# Patient Record
Sex: FEMALE | Race: BLACK OR AFRICAN AMERICAN | NOT HISPANIC OR LATINO | Employment: UNEMPLOYED | URBAN - METROPOLITAN AREA
[De-identification: names, ages, dates, MRNs, and addresses within clinical notes are randomized per-mention and may not be internally consistent; named-entity substitution may affect disease eponyms.]

---

## 2021-06-05 ENCOUNTER — RECORDS - HEALTHEAST (OUTPATIENT)
Dept: ADMINISTRATIVE | Facility: CLINIC | Age: 71
End: 2021-06-05

## 2023-06-10 ENCOUNTER — HOSPITAL ENCOUNTER (EMERGENCY)
Facility: CLINIC | Age: 73
Discharge: HOME | End: 2023-06-10
Attending: EMERGENCY MEDICINE | Admitting: EMERGENCY MEDICINE
Payer: COMMERCIAL

## 2023-06-10 ENCOUNTER — APPOINTMENT (OUTPATIENT)
Dept: CT IMAGING | Facility: CLINIC | Age: 73
End: 2023-06-10
Attending: EMERGENCY MEDICINE
Payer: COMMERCIAL

## 2023-06-10 VITALS
RESPIRATION RATE: 18 BRPM | TEMPERATURE: 97.5 F | HEART RATE: 84 BPM | SYSTOLIC BLOOD PRESSURE: 142 MMHG | DIASTOLIC BLOOD PRESSURE: 67 MMHG | OXYGEN SATURATION: 98 %

## 2023-06-10 DIAGNOSIS — R51.9 NONINTRACTABLE HEADACHE, UNSPECIFIED CHRONICITY PATTERN, UNSPECIFIED HEADACHE TYPE: ICD-10-CM

## 2023-06-10 PROCEDURE — 96375 TX/PRO/DX INJ NEW DRUG ADDON: CPT

## 2023-06-10 PROCEDURE — 99285 EMERGENCY DEPT VISIT HI MDM: CPT | Mod: 25

## 2023-06-10 PROCEDURE — 258N000003 HC RX IP 258 OP 636: Performed by: EMERGENCY MEDICINE

## 2023-06-10 PROCEDURE — 96374 THER/PROPH/DIAG INJ IV PUSH: CPT

## 2023-06-10 PROCEDURE — 70450 CT HEAD/BRAIN W/O DYE: CPT

## 2023-06-10 PROCEDURE — 250N000011 HC RX IP 250 OP 636: Performed by: EMERGENCY MEDICINE

## 2023-06-10 PROCEDURE — 96361 HYDRATE IV INFUSION ADD-ON: CPT

## 2023-06-10 RX ORDER — DIPHENHYDRAMINE HYDROCHLORIDE 50 MG/ML
25 INJECTION INTRAMUSCULAR; INTRAVENOUS ONCE
Status: COMPLETED | OUTPATIENT
Start: 2023-06-10 | End: 2023-06-10

## 2023-06-10 RX ORDER — NIFEDIPINE 60 MG/1
60 TABLET, EXTENDED RELEASE ORAL
COMMUNITY
Start: 2023-04-19

## 2023-06-10 RX ORDER — PROCHLORPERAZINE MALEATE 10 MG
5 TABLET ORAL EVERY 6 HOURS PRN
Qty: 12 TABLET | Refills: 0 | Status: SHIPPED | OUTPATIENT
Start: 2023-06-10

## 2023-06-10 RX ORDER — METOPROLOL SUCCINATE 100 MG/1
100 TABLET, EXTENDED RELEASE ORAL
Status: ON HOLD | COMMUNITY
Start: 2023-04-19 | End: 2024-02-27

## 2023-06-10 RX ORDER — TOBRAMYCIN AND DEXAMETHASONE 3; 1 MG/ML; MG/ML
SUSPENSION/ DROPS OPHTHALMIC
Status: ON HOLD | COMMUNITY
Start: 2022-03-02 | End: 2024-02-27

## 2023-06-10 RX ORDER — ATORVASTATIN CALCIUM 20 MG/1
20 TABLET, FILM COATED ORAL
COMMUNITY
Start: 2023-04-19

## 2023-06-10 RX ORDER — SODIUM CHLORIDE 9 MG/ML
INJECTION, SOLUTION INTRAVENOUS CONTINUOUS
Status: DISCONTINUED | OUTPATIENT
Start: 2023-06-10 | End: 2023-06-10 | Stop reason: HOSPADM

## 2023-06-10 RX ORDER — CYCLOSPORINE 0.5 MG/ML
1 EMULSION OPHTHALMIC 2 TIMES DAILY
COMMUNITY
Start: 2022-03-02

## 2023-06-10 RX ADMIN — SODIUM CHLORIDE 1000 ML: 9 INJECTION, SOLUTION INTRAVENOUS at 15:58

## 2023-06-10 RX ADMIN — PROCHLORPERAZINE EDISYLATE 5 MG: 5 INJECTION INTRAMUSCULAR; INTRAVENOUS at 15:59

## 2023-06-10 RX ADMIN — DIPHENHYDRAMINE HYDROCHLORIDE 25 MG: 50 INJECTION, SOLUTION INTRAMUSCULAR; INTRAVENOUS at 15:58

## 2023-06-10 ASSESSMENT — ACTIVITIES OF DAILY LIVING (ADL): ADLS_ACUITY_SCORE: 35

## 2023-06-10 NOTE — ED PROVIDER NOTES
History     Chief Complaint:  Headache       The history is provided by the patient and a relative.      Yane Courtney is a 72 year old female, anticoagulated on Eliquis, with history of chronic headache, traumatic brain injury, and CVA who presents to the ED via car with her daughter for evaluation of headache. Patient's daughter reports patient has a headache since a couple of days ago. She woke up this morning with nausea. She last took tylenol at 1300 today with little relief of symptoms. Patient denies vomiting, fever, chest pain, or shortness of breath. Patient's daughter denies recent falls of head injuries.    Independent Historian:   Daughter reported as above    Review of External Notes:   None     Medications:    Eliquis  Lipitor  Toprol  Procardia  Tambocor  Compazine  Mylicon  Linzess  Prevacid  Neurontin  Norco  Lucrecia-lanta    Past Medical History:    Osteoarthritis of right knee  PAD  Mild aortic stenosis  Mild mitral stenosis  Hypercholesterolemia  Hypertension  IBS  Osteopenia of multiple sites  IFG  Idiopathic peripheral neuropathy  Hx of traumatic brain injury  Hx of CVA  GERD  Chronic headache  Chronic bilateral low back pain with bilateral sciatica    Past Surgical History:    LAP Cholecystectomy  Cataract removal with implant, bilateral  Lumbar epidural steroid injection    Physical Exam     Patient Vitals for the past 24 hrs:   BP Temp Temp src Pulse Resp SpO2   06/10/23 1802 (!) 142/67 -- -- 84 -- --   06/10/23 1529 (!) 153/63 97.5  F (36.4  C) Oral 83 18 98 %        Physical Exam  General:  Sitting on bed.  Daughter at bedside.  HENT:  No obvious trauma to head  Right Ear:  External ear normal.   Left Ear:  External ear normal.   Nose:  Nose normal.   Eyes:  Conjunctivae and EOM are normal. Pupils are equal, round, and reactive.   Neck: Normal range of motion. Neck supple. No tracheal deviation present.   CV:  Normal heart sounds. No murmur heard.  Pulm/Chest: Effort normal and breath  sounds normal.   Abd: Soft. No distension. There is no tenderness. There is no rigidity, no rebound and no guarding.   M/S: Normal range of motion.   Neuro: Awake and alert. CN II-XII Grossly intact, no pronator drift, normal finger-nose-finger, visual fields intact by confrontation. Muscle strength is +5 proximal and distal in the bilateral upper and lower extremities. No dysarthria. Normal palm up, palm down. No meningeal signs  Skin: Skin is warm and dry. No rash noted. Not diaphoretic.   Psych: Normal mood and affect. Behavior is normal.     Emergency Department Course   Imaging:  Head CT w/o contrast   Final Result   IMPRESSION:   1.  No acute intracranial process.            Report per radiology    Emergency Department Course & Assessments:  Interventions:  Medications   0.9% sodium chloride BOLUS (0 mLs Intravenous Stopped 6/10/23 1800)     Followed by   sodium chloride 0.9% infusion (has no administration in time range)   prochlorperazine (COMPAZINE) injection 5 mg (5 mg Intravenous $Given 6/10/23 1559)   diphenhydrAMINE (BENADRYL) injection 25 mg (25 mg Intravenous $Given 6/10/23 1558)      Assessments:  1539 I obtained history and examined the patient as noted above.  1756 I rechecked the patient and explained findings. Patient reports they are feeling much better.    Independent Interpretation (X-rays, CTs, rhythm strip):  None    Consultations/Discussion of Management or Tests:  None     Social Determinants of Health affecting care:   None    Disposition:  The patient was discharged to home.     Impression & Plan    Medical Decision Making:  Yane Courtney is a very pleasant 72 year old year old patient who presents to the emergency department with concern of headache.  This began gradually a few days ago.  It has worsened today.  No trauma.  No fever at home and she is afebrile here.  Patient's neurologic exam is normal.  She is on Eliquis.  I did consider intracranial hemorrhage.  She consented and  desired a CT.  Fortunately, this is unremarkable.  She received the above interventions and felt much better.  She did report very slight eye discomfort which she has had with headaches before.  No loss or change in vision and pupillary responses normal and I doubt acute closed angle glaucoma of both eyes.  No injection or drainage to suggest conjunctivitis.  She has no meningeal signs to suggest meningitis.  States she felt significantly better with above interventions, Compazine prescribed in case she has any recurrent nausea or return of headache.  Recommended close follow-up with PCP.    The treatment plan was discussed with the patient and they expressed understanding of this plan and consented to the plan.  In addition, the patient will return to the emergency department if their symptoms persist, worsen, if new symptoms arise or if there is any concern as other pathology may be present that is not evident at this time. They also understand the importance of close follow up in the clinic and if unable to do so will return to the emergency department for a reevaluation. All questions were answered.    Diagnosis:    ICD-10-CM    1. Nonintractable headache, unspecified chronicity pattern, unspecified headache type  R51.9            Discharge Medications:  Discharge Medication List as of 6/10/2023  6:01 PM      START taking these medications    Details   prochlorperazine (COMPAZINE) 10 MG tablet Take 0.5 tablets (5 mg) by mouth every 6 hours as needed for nausea or vomiting, Disp-12 tablet, R-0, E-Prescribe                Scribe Disclosure:  Jono GILLESPIE Hailie, am serving as a scribe at 4:37 PM on 6/10/2023 to document services personally performed by Luis Forrest DO based on my observations and the provider's statements to me.   6/10/2023   Luis Forrest DO Anderson, Robert James, DO  06/10/23 8982

## 2023-06-10 NOTE — ED TRIAGE NOTES
Headache for two days, difficulty opening her eyes because of it. Denies any blurred vision or vision changes.      Triage Assessment       Row Name 06/10/23 3589       Triage Assessment (Adult)    Airway WDL WDL       Respiratory WDL    Respiratory WDL WDL       Skin Circulation/Temperature WDL    Skin Circulation/Temperature WDL WDL       Cardiac WDL    Cardiac WDL WDL       Peripheral/Neurovascular WDL    Peripheral Neurovascular WDL WDL       Cognitive/Neuro/Behavioral WDL    Cognitive/Neuro/Behavioral WDL X  headache

## 2023-12-26 ENCOUNTER — HOSPITAL ENCOUNTER (OUTPATIENT)
Dept: NUCLEAR MEDICINE | Facility: CLINIC | Age: 73
Setting detail: NUCLEAR MEDICINE
Discharge: HOME | End: 2023-12-26
Attending: INTERNAL MEDICINE | Admitting: INTERNAL MEDICINE
Payer: COMMERCIAL

## 2023-12-26 PROCEDURE — 78264 GASTRIC EMPTYING IMG STUDY: CPT

## 2023-12-26 PROCEDURE — A9541 TC99M SULFUR COLLOID: HCPCS | Performed by: INTERNAL MEDICINE

## 2023-12-26 PROCEDURE — 78264 GASTRIC EMPTYING IMG STUDY: CPT | Mod: 26 | Performed by: RADIOLOGY

## 2023-12-26 PROCEDURE — 343N000001 HC RX 343: Performed by: INTERNAL MEDICINE

## 2023-12-26 RX ADMIN — Medication 2 MILLICURIE: at 08:00

## 2024-02-05 ENCOUNTER — MEDICAL CORRESPONDENCE (OUTPATIENT)
Dept: HEALTH INFORMATION MANAGEMENT | Facility: CLINIC | Age: 74
End: 2024-02-05
Payer: COMMERCIAL

## 2024-02-27 ENCOUNTER — ANESTHESIA EVENT (OUTPATIENT)
Dept: SURGERY | Facility: CLINIC | Age: 74
End: 2024-02-27
Payer: COMMERCIAL

## 2024-02-27 ENCOUNTER — APPOINTMENT (OUTPATIENT)
Dept: GENERAL RADIOLOGY | Facility: CLINIC | Age: 74
End: 2024-02-27
Attending: ORTHOPAEDIC SURGERY
Payer: COMMERCIAL

## 2024-02-27 ENCOUNTER — ANESTHESIA (OUTPATIENT)
Dept: SURGERY | Facility: CLINIC | Age: 74
End: 2024-02-27
Payer: COMMERCIAL

## 2024-02-27 ENCOUNTER — HOSPITAL ENCOUNTER (OUTPATIENT)
Facility: CLINIC | Age: 74
Discharge: HOME | End: 2024-02-28
Attending: ORTHOPAEDIC SURGERY | Admitting: ORTHOPAEDIC SURGERY
Payer: COMMERCIAL

## 2024-02-27 ENCOUNTER — APPOINTMENT (OUTPATIENT)
Dept: PHYSICAL THERAPY | Facility: CLINIC | Age: 74
End: 2024-02-27
Attending: ORTHOPAEDIC SURGERY
Payer: COMMERCIAL

## 2024-02-27 DIAGNOSIS — Z96.651 S/P TOTAL KNEE ARTHROPLASTY, RIGHT: Primary | ICD-10-CM

## 2024-02-27 PROBLEM — G43.909 MIGRAINE HEADACHE: Status: ACTIVE | Noted: 2024-02-27

## 2024-02-27 PROBLEM — T37.8X5A: Status: ACTIVE | Noted: 2023-07-31

## 2024-02-27 PROBLEM — I05.0 MILD MITRAL STENOSIS: Status: ACTIVE | Noted: 2021-11-09

## 2024-02-27 PROBLEM — M85.89 OSTEOPENIA OF MULTIPLE SITES: Status: ACTIVE | Noted: 2020-02-05

## 2024-02-27 PROBLEM — M54.30 SCIATICA: Status: ACTIVE | Noted: 2024-02-27

## 2024-02-27 PROBLEM — E78.00 PURE HYPERCHOLESTEROLEMIA: Status: ACTIVE | Noted: 2020-11-02

## 2024-02-27 PROBLEM — I35.0 MILD AORTIC STENOSIS: Status: ACTIVE | Noted: 2021-11-09

## 2024-02-27 PROBLEM — M25.60 JOINT STIFFNESS: Status: ACTIVE | Noted: 2024-02-27

## 2024-02-27 PROBLEM — K58.1 IRRITABLE BOWEL SYNDROME WITH CONSTIPATION: Status: ACTIVE | Noted: 2020-06-05

## 2024-02-27 PROBLEM — I48.0 PAF (PAROXYSMAL ATRIAL FIBRILLATION) (H): Status: ACTIVE | Noted: 2021-11-17

## 2024-02-27 PROBLEM — Z86.73 HISTORY OF CVA (CEREBROVASCULAR ACCIDENT): Status: ACTIVE | Noted: 2019-09-25

## 2024-02-27 PROBLEM — M17.11 PRIMARY OSTEOARTHRITIS OF RIGHT KNEE: Status: ACTIVE | Noted: 2023-05-31

## 2024-02-27 LAB
CREAT SERPL-MCNC: 0.64 MG/DL (ref 0.51–0.95)
EGFRCR SERPLBLD CKD-EPI 2021: >90 ML/MIN/1.73M2
FASTING STATUS PATIENT QL REPORTED: YES
GLUCOSE SERPL-MCNC: 120 MG/DL (ref 70–99)
POTASSIUM SERPL-SCNC: 3.4 MMOL/L (ref 3.4–5.3)

## 2024-02-27 PROCEDURE — 250N000025 HC SEVOFLURANE, PER MIN: Performed by: ORTHOPAEDIC SURGERY

## 2024-02-27 PROCEDURE — 250N000011 HC RX IP 250 OP 636: Performed by: ORTHOPAEDIC SURGERY

## 2024-02-27 PROCEDURE — 258N000001 HC RX 258: Performed by: ORTHOPAEDIC SURGERY

## 2024-02-27 PROCEDURE — 250N000011 HC RX IP 250 OP 636

## 2024-02-27 PROCEDURE — 82947 ASSAY GLUCOSE BLOOD QUANT: CPT | Performed by: ANESTHESIOLOGY

## 2024-02-27 PROCEDURE — 258N000003 HC RX IP 258 OP 636: Performed by: ORTHOPAEDIC SURGERY

## 2024-02-27 PROCEDURE — 27447 TOTAL KNEE ARTHROPLASTY: CPT | Performed by: ANESTHESIOLOGY

## 2024-02-27 PROCEDURE — 82565 ASSAY OF CREATININE: CPT | Performed by: ORTHOPAEDIC SURGERY

## 2024-02-27 PROCEDURE — 250N000009 HC RX 250: Performed by: NURSE ANESTHETIST, CERTIFIED REGISTERED

## 2024-02-27 PROCEDURE — C1713 ANCHOR/SCREW BN/BN,TIS/BN: HCPCS | Performed by: ORTHOPAEDIC SURGERY

## 2024-02-27 PROCEDURE — 250N000011 HC RX IP 250 OP 636: Performed by: PHYSICIAN ASSISTANT

## 2024-02-27 PROCEDURE — 250N000011 HC RX IP 250 OP 636: Performed by: NURSE ANESTHETIST, CERTIFIED REGISTERED

## 2024-02-27 PROCEDURE — 27447 TOTAL KNEE ARTHROPLASTY: CPT | Performed by: NURSE ANESTHETIST, CERTIFIED REGISTERED

## 2024-02-27 PROCEDURE — C9113 INJ PANTOPRAZOLE SODIUM, VIA: HCPCS

## 2024-02-27 PROCEDURE — 999N000141 HC STATISTIC PRE-PROCEDURE NURSING ASSESSMENT: Performed by: ORTHOPAEDIC SURGERY

## 2024-02-27 PROCEDURE — 258N000003 HC RX IP 258 OP 636: Performed by: ANESTHESIOLOGY

## 2024-02-27 PROCEDURE — 99100 ANES PT EXTEME AGE<1 YR&>70: CPT | Performed by: NURSE ANESTHETIST, CERTIFIED REGISTERED

## 2024-02-27 PROCEDURE — 250N000013 HC RX MED GY IP 250 OP 250 PS 637: Performed by: PHYSICIAN ASSISTANT

## 2024-02-27 PROCEDURE — 64447 NJX AA&/STRD FEMORAL NRV IMG: CPT | Mod: 59 | Performed by: ANESTHESIOLOGY

## 2024-02-27 PROCEDURE — C1776 JOINT DEVICE (IMPLANTABLE): HCPCS | Performed by: ORTHOPAEDIC SURGERY

## 2024-02-27 PROCEDURE — 999N000065 XR KNEE PORT RIGHT 1/2 VIEWS: Mod: RT

## 2024-02-27 PROCEDURE — 250N000011 HC RX IP 250 OP 636: Performed by: ANESTHESIOLOGY

## 2024-02-27 PROCEDURE — 36415 COLL VENOUS BLD VENIPUNCTURE: CPT | Performed by: ANESTHESIOLOGY

## 2024-02-27 PROCEDURE — 99204 OFFICE O/P NEW MOD 45 MIN: CPT

## 2024-02-27 PROCEDURE — 250N000013 HC RX MED GY IP 250 OP 250 PS 637: Performed by: ANESTHESIOLOGY

## 2024-02-27 PROCEDURE — 272N000001 HC OR GENERAL SUPPLY STERILE: Performed by: ORTHOPAEDIC SURGERY

## 2024-02-27 PROCEDURE — 250N000013 HC RX MED GY IP 250 OP 250 PS 637: Performed by: ORTHOPAEDIC SURGERY

## 2024-02-27 PROCEDURE — 710N000009 HC RECOVERY PHASE 1, LEVEL 1, PER MIN: Performed by: ORTHOPAEDIC SURGERY

## 2024-02-27 PROCEDURE — 370N000017 HC ANESTHESIA TECHNICAL FEE, PER MIN: Performed by: ORTHOPAEDIC SURGERY

## 2024-02-27 PROCEDURE — 84132 ASSAY OF SERUM POTASSIUM: CPT | Performed by: ANESTHESIOLOGY

## 2024-02-27 PROCEDURE — 250N000009 HC RX 250: Performed by: ORTHOPAEDIC SURGERY

## 2024-02-27 PROCEDURE — 250N000013 HC RX MED GY IP 250 OP 250 PS 637

## 2024-02-27 PROCEDURE — 258N000003 HC RX IP 258 OP 636: Performed by: PHYSICIAN ASSISTANT

## 2024-02-27 PROCEDURE — 250N000009 HC RX 250: Performed by: ANESTHESIOLOGY

## 2024-02-27 PROCEDURE — 97161 PT EVAL LOW COMPLEX 20 MIN: CPT | Mod: GP

## 2024-02-27 PROCEDURE — 97530 THERAPEUTIC ACTIVITIES: CPT | Mod: GP

## 2024-02-27 PROCEDURE — 360N000077 HC SURGERY LEVEL 4, PER MIN: Performed by: ORTHOPAEDIC SURGERY

## 2024-02-27 PROCEDURE — 258N000003 HC RX IP 258 OP 636: Performed by: NURSE ANESTHETIST, CERTIFIED REGISTERED

## 2024-02-27 DEVICE — LEGION PS NP NARROW FEMORAL SZ 4 RT
Type: IMPLANTABLE DEVICE | Site: KNEE | Status: FUNCTIONAL
Brand: LEGION

## 2024-02-27 DEVICE — SIMPLEX® HV IS A FAST-SETTING ACRYLIC RESIN FOR USE IN BONE SURGERY. MIXING THE TWO SEPARATE STERILE COMPONENTS PRODUCES A DUCTILE BONE CEMENT WHICH, AFTER HARDENING, FIXES THE IMPLANT AND TRANSFERS STRESSES PRODUCED DURING MOVEMENT EVENLY TO THE BONE. SIMPLEX® HV CEMENT POWDER ALSO CONTAINS INSOLUBLE ZIRCONIUM DIOXIDE AS AN X-RAY CONTRAST MEDIUM. SIMPLEX® HV DOES NOT EMIT A SIGNAL AND DOES NOT POSE A SAFETY RISK IN A MAGNETIC RESONANCE ENVIRONMENT.
Type: IMPLANTABLE DEVICE | Site: KNEE | Status: FUNCTIONAL
Brand: SIMPLEX HV

## 2024-02-27 DEVICE — GENESIS II RESURFACING PATELLAR                                    PROSTHESIS  29MM
Type: IMPLANTABLE DEVICE | Site: KNEE | Status: FUNCTIONAL
Brand: GENESIS II

## 2024-02-27 DEVICE — LEGION PS HIGH FLEX XLPE SZ 1-2 11MM
Type: IMPLANTABLE DEVICE | Site: KNEE | Status: FUNCTIONAL
Brand: LEGION

## 2024-02-27 DEVICE — GENESIS II NON-POROUS TIBIAL                                    BASEPLATE SIZE 2 RIGHT
Type: IMPLANTABLE DEVICE | Site: KNEE | Status: FUNCTIONAL
Brand: GENESIS II

## 2024-02-27 RX ORDER — ONDANSETRON 2 MG/ML
INJECTION INTRAMUSCULAR; INTRAVENOUS PRN
Status: DISCONTINUED | OUTPATIENT
Start: 2024-02-27 | End: 2024-02-27

## 2024-02-27 RX ORDER — TRANEXAMIC ACID 650 MG/1
1950 TABLET ORAL ONCE
Status: COMPLETED | OUTPATIENT
Start: 2024-02-27 | End: 2024-02-27

## 2024-02-27 RX ORDER — HYDROXYZINE HYDROCHLORIDE 10 MG/1
10 TABLET, FILM COATED ORAL EVERY 6 HOURS PRN
Qty: 30 TABLET | Refills: 0 | Status: SHIPPED | OUTPATIENT
Start: 2024-02-27

## 2024-02-27 RX ORDER — LIDOCAINE 40 MG/G
CREAM TOPICAL
Status: DISCONTINUED | OUTPATIENT
Start: 2024-02-27 | End: 2024-02-27 | Stop reason: HOSPADM

## 2024-02-27 RX ORDER — GABAPENTIN 300 MG/1
300 CAPSULE ORAL 3 TIMES DAILY
COMMUNITY

## 2024-02-27 RX ORDER — CEFAZOLIN SODIUM 1 G/3ML
1 INJECTION, POWDER, FOR SOLUTION INTRAMUSCULAR; INTRAVENOUS EVERY 8 HOURS
Qty: 10 ML | Refills: 0 | Status: COMPLETED | OUTPATIENT
Start: 2024-02-27 | End: 2024-02-28

## 2024-02-27 RX ORDER — AMOXICILLIN 250 MG
1-2 CAPSULE ORAL 2 TIMES DAILY
Qty: 30 TABLET | Refills: 0 | Status: SHIPPED | OUTPATIENT
Start: 2024-02-27

## 2024-02-27 RX ORDER — CALCIUM CARBONATE 500 MG/1
1 TABLET, CHEWABLE ORAL 2 TIMES DAILY PRN
COMMUNITY

## 2024-02-27 RX ORDER — HYDRALAZINE HYDROCHLORIDE 20 MG/ML
10 INJECTION INTRAMUSCULAR; INTRAVENOUS EVERY 4 HOURS PRN
Status: DISCONTINUED | OUTPATIENT
Start: 2024-02-27 | End: 2024-02-28 | Stop reason: HOSPADM

## 2024-02-27 RX ORDER — CEFAZOLIN SODIUM/WATER 2 G/20 ML
2 SYRINGE (ML) INTRAVENOUS
Status: COMPLETED | OUTPATIENT
Start: 2024-02-27 | End: 2024-02-27

## 2024-02-27 RX ORDER — SODIUM CHLORIDE, SODIUM LACTATE, POTASSIUM CHLORIDE, CALCIUM CHLORIDE 600; 310; 30; 20 MG/100ML; MG/100ML; MG/100ML; MG/100ML
INJECTION, SOLUTION INTRAVENOUS CONTINUOUS
Status: DISCONTINUED | OUTPATIENT
Start: 2024-02-27 | End: 2024-02-27 | Stop reason: HOSPADM

## 2024-02-27 RX ORDER — NALOXONE HYDROCHLORIDE 0.4 MG/ML
0.1 INJECTION, SOLUTION INTRAMUSCULAR; INTRAVENOUS; SUBCUTANEOUS
Status: DISCONTINUED | OUTPATIENT
Start: 2024-02-27 | End: 2024-02-27 | Stop reason: HOSPADM

## 2024-02-27 RX ORDER — METOPROLOL SUCCINATE 100 MG/1
100 TABLET, EXTENDED RELEASE ORAL DAILY
Status: DISCONTINUED | OUTPATIENT
Start: 2024-02-27 | End: 2024-02-27

## 2024-02-27 RX ORDER — MAGNESIUM HYDROXIDE 1200 MG/15ML
LIQUID ORAL PRN
Status: DISCONTINUED | OUTPATIENT
Start: 2024-02-27 | End: 2024-02-27 | Stop reason: HOSPADM

## 2024-02-27 RX ORDER — BISACODYL 10 MG
10 SUPPOSITORY, RECTAL RECTAL DAILY PRN
Status: DISCONTINUED | OUTPATIENT
Start: 2024-03-01 | End: 2024-02-28 | Stop reason: HOSPADM

## 2024-02-27 RX ORDER — HYDROMORPHONE HCL IN WATER/PF 6 MG/30 ML
0.2 PATIENT CONTROLLED ANALGESIA SYRINGE INTRAVENOUS EVERY 5 MIN PRN
Status: DISCONTINUED | OUTPATIENT
Start: 2024-02-27 | End: 2024-02-27 | Stop reason: HOSPADM

## 2024-02-27 RX ORDER — ONDANSETRON 2 MG/ML
4 INJECTION INTRAMUSCULAR; INTRAVENOUS EVERY 30 MIN PRN
Status: DISCONTINUED | OUTPATIENT
Start: 2024-02-27 | End: 2024-02-27 | Stop reason: HOSPADM

## 2024-02-27 RX ORDER — HYDROMORPHONE HCL IN WATER/PF 6 MG/30 ML
0.4 PATIENT CONTROLLED ANALGESIA SYRINGE INTRAVENOUS
Status: DISCONTINUED | OUTPATIENT
Start: 2024-02-27 | End: 2024-02-28 | Stop reason: HOSPADM

## 2024-02-27 RX ORDER — MULTIVITAMIN,THERAPEUTIC
1 TABLET ORAL DAILY
COMMUNITY

## 2024-02-27 RX ORDER — ACETAMINOPHEN 325 MG/1
650 TABLET ORAL EVERY 4 HOURS PRN
Qty: 100 TABLET | Refills: 0 | Status: SHIPPED | OUTPATIENT
Start: 2024-02-27

## 2024-02-27 RX ORDER — ONDANSETRON 2 MG/ML
4 INJECTION INTRAMUSCULAR; INTRAVENOUS EVERY 6 HOURS PRN
Status: DISCONTINUED | OUTPATIENT
Start: 2024-02-27 | End: 2024-02-28 | Stop reason: HOSPADM

## 2024-02-27 RX ORDER — POLYETHYLENE GLYCOL 3350 17 G/17G
17 POWDER, FOR SOLUTION ORAL DAILY
Status: DISCONTINUED | OUTPATIENT
Start: 2024-02-28 | End: 2024-02-28 | Stop reason: HOSPADM

## 2024-02-27 RX ORDER — FENTANYL CITRATE 0.05 MG/ML
25 INJECTION, SOLUTION INTRAMUSCULAR; INTRAVENOUS EVERY 5 MIN PRN
Status: DISCONTINUED | OUTPATIENT
Start: 2024-02-27 | End: 2024-02-27 | Stop reason: HOSPADM

## 2024-02-27 RX ORDER — AMOXICILLIN 250 MG
1 CAPSULE ORAL 2 TIMES DAILY
Status: DISCONTINUED | OUTPATIENT
Start: 2024-02-27 | End: 2024-02-28 | Stop reason: HOSPADM

## 2024-02-27 RX ORDER — LANSOPRAZOLE 30 MG/1
30 CAPSULE, DELAYED RELEASE ORAL 2 TIMES DAILY
COMMUNITY

## 2024-02-27 RX ORDER — VANCOMYCIN HYDROCHLORIDE 1 G/20ML
INJECTION, POWDER, LYOPHILIZED, FOR SOLUTION INTRAVENOUS PRN
Status: DISCONTINUED | OUTPATIENT
Start: 2024-02-27 | End: 2024-02-27 | Stop reason: HOSPADM

## 2024-02-27 RX ORDER — MECLIZINE HCL 12.5 MG 12.5 MG/1
12.5 TABLET ORAL 3 TIMES DAILY PRN
Status: DISCONTINUED | OUTPATIENT
Start: 2024-02-27 | End: 2024-02-28 | Stop reason: HOSPADM

## 2024-02-27 RX ORDER — SIMETHICONE 80 MG
80 TABLET,CHEWABLE ORAL EVERY 6 HOURS PRN
COMMUNITY

## 2024-02-27 RX ORDER — PROPOFOL 10 MG/ML
INJECTION, EMULSION INTRAVENOUS PRN
Status: DISCONTINUED | OUTPATIENT
Start: 2024-02-27 | End: 2024-02-27

## 2024-02-27 RX ORDER — HYDROMORPHONE HCL IN WATER/PF 6 MG/30 ML
0.4 PATIENT CONTROLLED ANALGESIA SYRINGE INTRAVENOUS EVERY 5 MIN PRN
Status: DISCONTINUED | OUTPATIENT
Start: 2024-02-27 | End: 2024-02-27 | Stop reason: HOSPADM

## 2024-02-27 RX ORDER — ONDANSETRON 4 MG/1
4 TABLET, ORALLY DISINTEGRATING ORAL EVERY 6 HOURS PRN
Status: DISCONTINUED | OUTPATIENT
Start: 2024-02-27 | End: 2024-02-28 | Stop reason: HOSPADM

## 2024-02-27 RX ORDER — HYDROMORPHONE HYDROCHLORIDE 2 MG/1
2 TABLET ORAL EVERY 4 HOURS PRN
Status: DISCONTINUED | OUTPATIENT
Start: 2024-02-27 | End: 2024-02-28 | Stop reason: HOSPADM

## 2024-02-27 RX ORDER — LANSOPRAZOLE 30 MG/1
30 TABLET, ORALLY DISINTEGRATING, DELAYED RELEASE ORAL ONCE
Status: DISCONTINUED | OUTPATIENT
Start: 2024-02-27 | End: 2024-02-27

## 2024-02-27 RX ORDER — PROCHLORPERAZINE MALEATE 5 MG
5 TABLET ORAL EVERY 6 HOURS PRN
Status: DISCONTINUED | OUTPATIENT
Start: 2024-02-27 | End: 2024-02-28 | Stop reason: HOSPADM

## 2024-02-27 RX ORDER — MAGNESIUM HYDROXIDE/ALUMINUM HYDROXICE/SIMETHICONE 120; 1200; 1200 MG/30ML; MG/30ML; MG/30ML
15 SUSPENSION ORAL ONCE
Status: COMPLETED | OUTPATIENT
Start: 2024-02-27 | End: 2024-02-27

## 2024-02-27 RX ORDER — CITRIC ACID/SODIUM CITRATE 334-500MG
30 SOLUTION, ORAL ORAL ONCE
Status: COMPLETED | OUTPATIENT
Start: 2024-02-27 | End: 2024-02-27

## 2024-02-27 RX ORDER — NALOXONE HYDROCHLORIDE 0.4 MG/ML
0.2 INJECTION, SOLUTION INTRAMUSCULAR; INTRAVENOUS; SUBCUTANEOUS
Status: DISCONTINUED | OUTPATIENT
Start: 2024-02-27 | End: 2024-02-28 | Stop reason: HOSPADM

## 2024-02-27 RX ORDER — ACETAMINOPHEN 325 MG/1
650 TABLET ORAL EVERY 4 HOURS PRN
Status: DISCONTINUED | OUTPATIENT
Start: 2024-03-01 | End: 2024-02-28 | Stop reason: HOSPADM

## 2024-02-27 RX ORDER — HYDROMORPHONE HYDROCHLORIDE 2 MG/1
4 TABLET ORAL EVERY 4 HOURS PRN
Status: DISCONTINUED | OUTPATIENT
Start: 2024-02-27 | End: 2024-02-28 | Stop reason: HOSPADM

## 2024-02-27 RX ORDER — MAGNESIUM HYDROXIDE/ALUMINUM HYDROXICE/SIMETHICONE 120; 1200; 1200 MG/30ML; MG/30ML; MG/30ML
15 SUSPENSION ORAL 3 TIMES DAILY PRN
Status: DISCONTINUED | OUTPATIENT
Start: 2024-02-27 | End: 2024-02-28 | Stop reason: HOSPADM

## 2024-02-27 RX ORDER — HYDROMORPHONE HCL IN WATER/PF 6 MG/30 ML
0.2 PATIENT CONTROLLED ANALGESIA SYRINGE INTRAVENOUS
Status: DISCONTINUED | OUTPATIENT
Start: 2024-02-27 | End: 2024-02-28 | Stop reason: HOSPADM

## 2024-02-27 RX ORDER — HYDROMORPHONE HYDROCHLORIDE 2 MG/1
2-4 TABLET ORAL EVERY 4 HOURS PRN
Qty: 31 TABLET | Refills: 0 | Status: SHIPPED | OUTPATIENT
Start: 2024-02-27

## 2024-02-27 RX ORDER — LIDOCAINE 40 MG/G
CREAM TOPICAL
Status: DISCONTINUED | OUTPATIENT
Start: 2024-02-27 | End: 2024-02-28 | Stop reason: HOSPADM

## 2024-02-27 RX ORDER — OXYMETAZOLINE HYDROCHLORIDE 0.05 G/100ML
2 SPRAY NASAL 2 TIMES DAILY
COMMUNITY

## 2024-02-27 RX ORDER — PANTOPRAZOLE SODIUM 40 MG/1
40 TABLET, DELAYED RELEASE ORAL
Status: DISCONTINUED | OUTPATIENT
Start: 2024-02-28 | End: 2024-02-27

## 2024-02-27 RX ORDER — PANTOPRAZOLE SODIUM 20 MG/1
20 TABLET, DELAYED RELEASE ORAL
Status: DISCONTINUED | OUTPATIENT
Start: 2024-02-27 | End: 2024-02-27

## 2024-02-27 RX ORDER — CEFAZOLIN SODIUM/WATER 2 G/20 ML
2 SYRINGE (ML) INTRAVENOUS SEE ADMIN INSTRUCTIONS
Status: DISCONTINUED | OUTPATIENT
Start: 2024-02-27 | End: 2024-02-27 | Stop reason: HOSPADM

## 2024-02-27 RX ORDER — ONDANSETRON 4 MG/1
4 TABLET, ORALLY DISINTEGRATING ORAL EVERY 30 MIN PRN
Status: DISCONTINUED | OUTPATIENT
Start: 2024-02-27 | End: 2024-02-27 | Stop reason: HOSPADM

## 2024-02-27 RX ORDER — FENTANYL CITRATE 0.05 MG/ML
50 INJECTION, SOLUTION INTRAMUSCULAR; INTRAVENOUS EVERY 5 MIN PRN
Status: DISCONTINUED | OUTPATIENT
Start: 2024-02-27 | End: 2024-02-27 | Stop reason: HOSPADM

## 2024-02-27 RX ORDER — FENTANYL CITRATE 0.05 MG/ML
50 INJECTION, SOLUTION INTRAMUSCULAR; INTRAVENOUS
Status: DISCONTINUED | OUTPATIENT
Start: 2024-02-27 | End: 2024-02-27 | Stop reason: HOSPADM

## 2024-02-27 RX ORDER — ASPIRIN 81 MG/1
81 TABLET ORAL 2 TIMES DAILY
Qty: 60 TABLET | Refills: 0 | Status: SHIPPED | OUTPATIENT
Start: 2024-02-27 | End: 2024-02-27

## 2024-02-27 RX ORDER — ACETAMINOPHEN 325 MG/1
975 TABLET ORAL EVERY 8 HOURS
Qty: 27 TABLET | Refills: 0 | Status: DISCONTINUED | OUTPATIENT
Start: 2024-02-27 | End: 2024-02-28 | Stop reason: HOSPADM

## 2024-02-27 RX ORDER — ACETAMINOPHEN 500 MG
500-1000 TABLET ORAL EVERY 6 HOURS PRN
COMMUNITY

## 2024-02-27 RX ORDER — METOCLOPRAMIDE 10 MG/1
10 TABLET ORAL 4 TIMES DAILY PRN
COMMUNITY

## 2024-02-27 RX ORDER — PANTOPRAZOLE SODIUM 40 MG/1
40 TABLET, DELAYED RELEASE ORAL 2 TIMES DAILY PRN
Status: DISCONTINUED | OUTPATIENT
Start: 2024-02-27 | End: 2024-02-27

## 2024-02-27 RX ORDER — HYDROXYZINE HYDROCHLORIDE 10 MG/1
10 TABLET, FILM COATED ORAL EVERY 6 HOURS PRN
Status: DISCONTINUED | OUTPATIENT
Start: 2024-02-27 | End: 2024-02-28 | Stop reason: HOSPADM

## 2024-02-27 RX ORDER — LIDOCAINE HYDROCHLORIDE 20 MG/ML
INJECTION, SOLUTION INFILTRATION; PERINEURAL PRN
Status: DISCONTINUED | OUTPATIENT
Start: 2024-02-27 | End: 2024-02-27

## 2024-02-27 RX ORDER — CARBOXYMETHYLCELLULOSE SODIUM 5 MG/ML
1 SOLUTION/ DROPS OPHTHALMIC 2 TIMES DAILY
Status: DISCONTINUED | OUTPATIENT
Start: 2024-02-27 | End: 2024-02-28 | Stop reason: HOSPADM

## 2024-02-27 RX ORDER — NALOXONE HYDROCHLORIDE 0.4 MG/ML
0.4 INJECTION, SOLUTION INTRAMUSCULAR; INTRAVENOUS; SUBCUTANEOUS
Status: DISCONTINUED | OUTPATIENT
Start: 2024-02-27 | End: 2024-02-28 | Stop reason: HOSPADM

## 2024-02-27 RX ORDER — DEXAMETHASONE SODIUM PHOSPHATE 4 MG/ML
INJECTION, SOLUTION INTRA-ARTICULAR; INTRALESIONAL; INTRAMUSCULAR; INTRAVENOUS; SOFT TISSUE PRN
Status: DISCONTINUED | OUTPATIENT
Start: 2024-02-27 | End: 2024-02-27

## 2024-02-27 RX ORDER — ATORVASTATIN CALCIUM 20 MG/1
20 TABLET, FILM COATED ORAL DAILY
Status: DISCONTINUED | OUTPATIENT
Start: 2024-02-28 | End: 2024-02-28 | Stop reason: HOSPADM

## 2024-02-27 RX ORDER — SODIUM CHLORIDE, SODIUM LACTATE, POTASSIUM CHLORIDE, CALCIUM CHLORIDE 600; 310; 30; 20 MG/100ML; MG/100ML; MG/100ML; MG/100ML
INJECTION, SOLUTION INTRAVENOUS CONTINUOUS
Status: DISCONTINUED | OUTPATIENT
Start: 2024-02-27 | End: 2024-02-28 | Stop reason: HOSPADM

## 2024-02-27 RX ORDER — HYDROMORPHONE HYDROCHLORIDE 1 MG/ML
INJECTION, SOLUTION INTRAMUSCULAR; INTRAVENOUS; SUBCUTANEOUS PRN
Status: DISCONTINUED | OUTPATIENT
Start: 2024-02-27 | End: 2024-02-27

## 2024-02-27 RX ADMIN — HYDROMORPHONE HYDROCHLORIDE 4 MG: 2 TABLET ORAL at 20:54

## 2024-02-27 RX ADMIN — ACETAMINOPHEN 975 MG: 325 TABLET, FILM COATED ORAL at 21:39

## 2024-02-27 RX ADMIN — PHENYLEPHRINE HYDROCHLORIDE 100 MCG: 10 INJECTION INTRAVENOUS at 09:53

## 2024-02-27 RX ADMIN — CARBOXYMETHYLCELLULOSE SODIUM 1 DROP: 5 SOLUTION/ DROPS OPHTHALMIC at 13:57

## 2024-02-27 RX ADMIN — MIDAZOLAM 1 MG: 1 INJECTION INTRAMUSCULAR; INTRAVENOUS at 08:51

## 2024-02-27 RX ADMIN — DEXAMETHASONE SODIUM PHOSPHATE 4 MG: 4 INJECTION, SOLUTION INTRA-ARTICULAR; INTRALESIONAL; INTRAMUSCULAR; INTRAVENOUS; SOFT TISSUE at 09:52

## 2024-02-27 RX ADMIN — PROPOFOL 150 MG: 10 INJECTION, EMULSION INTRAVENOUS at 09:39

## 2024-02-27 RX ADMIN — SODIUM CHLORIDE, POTASSIUM CHLORIDE, SODIUM LACTATE AND CALCIUM CHLORIDE: 600; 310; 30; 20 INJECTION, SOLUTION INTRAVENOUS at 14:32

## 2024-02-27 RX ADMIN — ALUMINUM HYDROXIDE, MAGNESIUM HYDROXIDE, AND SIMETHICONE 15 ML: 200; 200; 20 SUSPENSION ORAL at 17:46

## 2024-02-27 RX ADMIN — ALUMINUM HYDROXIDE, MAGNESIUM HYDROXIDE, AND SIMETHICONE 15 ML: 200; 200; 20 SUSPENSION ORAL at 15:19

## 2024-02-27 RX ADMIN — NIFEDIPINE 60 MG: 60 TABLET, FILM COATED, EXTENDED RELEASE ORAL at 17:57

## 2024-02-27 RX ADMIN — SODIUM CHLORIDE, POTASSIUM CHLORIDE, SODIUM LACTATE AND CALCIUM CHLORIDE: 600; 310; 30; 20 INJECTION, SOLUTION INTRAVENOUS at 08:52

## 2024-02-27 RX ADMIN — ONDANSETRON 4 MG: 2 INJECTION INTRAMUSCULAR; INTRAVENOUS at 10:54

## 2024-02-27 RX ADMIN — PHENYLEPHRINE HYDROCHLORIDE 100 MCG: 10 INJECTION INTRAVENOUS at 09:46

## 2024-02-27 RX ADMIN — HYDROXYZINE HYDROCHLORIDE 10 MG: 10 TABLET ORAL at 21:39

## 2024-02-27 RX ADMIN — HYDROMORPHONE HYDROCHLORIDE 0.2 MG: 0.2 INJECTION, SOLUTION INTRAMUSCULAR; INTRAVENOUS; SUBCUTANEOUS at 18:38

## 2024-02-27 RX ADMIN — SODIUM CHLORIDE, POTASSIUM CHLORIDE, SODIUM LACTATE AND CALCIUM CHLORIDE: 600; 310; 30; 20 INJECTION, SOLUTION INTRAVENOUS at 12:08

## 2024-02-27 RX ADMIN — TRANEXAMIC ACID 1950 MG: 650 TABLET ORAL at 07:28

## 2024-02-27 RX ADMIN — SUCCINYLCHOLINE CHLORIDE 80 MG: 20 INJECTION, SOLUTION INTRAMUSCULAR; INTRAVENOUS; PARENTERAL at 09:39

## 2024-02-27 RX ADMIN — CARBOXYMETHYLCELLULOSE SODIUM 1 DROP: 5 SOLUTION/ DROPS OPHTHALMIC at 20:54

## 2024-02-27 RX ADMIN — LIDOCAINE HYDROCHLORIDE 100 MG: 20 INJECTION, SOLUTION INFILTRATION; PERINEURAL at 09:39

## 2024-02-27 RX ADMIN — SODIUM CHLORIDE, POTASSIUM CHLORIDE, SODIUM LACTATE AND CALCIUM CHLORIDE: 600; 310; 30; 20 INJECTION, SOLUTION INTRAVENOUS at 08:46

## 2024-02-27 RX ADMIN — ONDANSETRON 4 MG: 2 INJECTION INTRAMUSCULAR; INTRAVENOUS at 13:09

## 2024-02-27 RX ADMIN — PROCHLORPERAZINE EDISYLATE 5 MG: 5 INJECTION INTRAMUSCULAR; INTRAVENOUS at 13:55

## 2024-02-27 RX ADMIN — FENTANYL CITRATE 100 MCG: 0.05 INJECTION, SOLUTION INTRAMUSCULAR; INTRAVENOUS at 09:39

## 2024-02-27 RX ADMIN — PANTOPRAZOLE SODIUM 40 MG: 40 INJECTION, POWDER, FOR SOLUTION INTRAVENOUS at 16:47

## 2024-02-27 RX ADMIN — ROCURONIUM BROMIDE 30 MG: 50 INJECTION, SOLUTION INTRAVENOUS at 09:50

## 2024-02-27 RX ADMIN — ROCURONIUM BROMIDE 10 MG: 50 INJECTION, SOLUTION INTRAVENOUS at 10:30

## 2024-02-27 RX ADMIN — HYDROMORPHONE HYDROCHLORIDE 0.5 MG: 1 INJECTION, SOLUTION INTRAMUSCULAR; INTRAVENOUS; SUBCUTANEOUS at 10:05

## 2024-02-27 RX ADMIN — BUPIVACAINE HYDROCHLORIDE 15 ML: 5 INJECTION, SOLUTION EPIDURAL; INTRACAUDAL; PERINEURAL at 09:55

## 2024-02-27 RX ADMIN — CEFAZOLIN 1 G: 1 INJECTION, POWDER, FOR SOLUTION INTRAMUSCULAR; INTRAVENOUS at 17:49

## 2024-02-27 RX ADMIN — SODIUM CITRATE AND CITRIC ACID MONOHYDRATE 30 ML: 500; 334 SOLUTION ORAL at 08:40

## 2024-02-27 RX ADMIN — Medication 2 G: at 09:42

## 2024-02-27 RX ADMIN — SUGAMMADEX 150 MG: 100 INJECTION, SOLUTION INTRAVENOUS at 11:15

## 2024-02-27 RX ADMIN — ACETAMINOPHEN 975 MG: 325 TABLET, FILM COATED ORAL at 15:20

## 2024-02-27 RX ADMIN — DOCUSATE SODIUM 50 MG AND SENNOSIDES 8.6 MG 1 TABLET: 8.6; 5 TABLET, FILM COATED ORAL at 20:54

## 2024-02-27 ASSESSMENT — ACTIVITIES OF DAILY LIVING (ADL)
ADLS_ACUITY_SCORE: 27
ADLS_ACUITY_SCORE: 18
ADLS_ACUITY_SCORE: 18
ADLS_ACUITY_SCORE: 23
ADLS_ACUITY_SCORE: 23
ADLS_ACUITY_SCORE: 18
ADLS_ACUITY_SCORE: 35
ADLS_ACUITY_SCORE: 21
ADLS_ACUITY_SCORE: 27
ADLS_ACUITY_SCORE: 33
ADLS_ACUITY_SCORE: 23
ADLS_ACUITY_SCORE: 18
ADLS_ACUITY_SCORE: 22
ADLS_ACUITY_SCORE: 18
ADLS_ACUITY_SCORE: 27
ADLS_ACUITY_SCORE: 18
ADLS_ACUITY_SCORE: 18

## 2024-02-27 ASSESSMENT — LIFESTYLE VARIABLES: TOBACCO_USE: 0

## 2024-02-27 NOTE — ANESTHESIA PREPROCEDURE EVALUATION
"Anesthesia Pre-Procedure Evaluation    Patient: Yane Courtney   MRN: [id number] : 1950        Procedure : Procedure(s):  RIGHT  KNEE TOTAL ARTHROPLASTY          Past Medical History:   Diagnosis Date    Allergic rhinitis due to pollen     Gastroesophageal reflux disease     Hypertension       Past Surgical History:   Procedure Laterality Date    CHOLECYSTECTOMY      EYE SURGERY Bilateral     cataract surgery    IR LUMBAR EPIDURAL STEROID INJECTION  2014      Allergies   Allergen Reactions    Aspirin (Tartrazine Only) [Tartrazine] Unknown      Social History     Tobacco Use    Smoking status: Not on file    Smokeless tobacco: Not on file   Substance Use Topics    Alcohol use: Not on file      Wt Readings from Last 1 Encounters:   No data found for Wt        Anesthesia Evaluation            ROS/MED HX  ENT/Pulmonary:     (+)           allergic rhinitis,                          (-) tobacco use and sleep apnea   Neurologic:       Cardiovascular:     (+)  hypertension- -   -  - -                                      METS/Exercise Tolerance:     Hematologic:       Musculoskeletal:   (+)  arthritis,             GI/Hepatic: Comment: Gastroparesis, esophagitis    (+) GERD, Symptomatic,                  Renal/Genitourinary:       Endo:       Psychiatric/Substance Use:       Infectious Disease:       Malignancy:       Other:            Physical Exam    Airway        Mallampati: II   TM distance: > 3 FB   Neck ROM: full   Mouth opening: > 3 cm    Respiratory Devices and Support         Dental       (+) Minor Abnormalities - some fillings, tiny chips      Cardiovascular   cardiovascular exam normal          Pulmonary   pulmonary exam normal                OUTSIDE LABS:  CBC: No results found for: \"WBC\", \"HGB\", \"HCT\", \"PLT\"  BMP: No results found for: \"NA\", \"POTASSIUM\", \"CHLORIDE\", \"CO2\", \"BUN\", \"CR\", \"GLC\"  COAGS: No results found for: \"PTT\", \"INR\", \"FIBR\"  POC: No results found for: \"BGM\", \"HCG\", " "\"HCGS\"  HEPATIC: No results found for: \"ALBUMIN\", \"PROTTOTAL\", \"ALT\", \"AST\", \"GGT\", \"ALKPHOS\", \"BILITOTAL\", \"BILIDIRECT\", \"ROSLYN\"  OTHER: No results found for: \"PH\", \"LACT\", \"A1C\", \"SHIELA\", \"PHOS\", \"MAG\", \"LIPASE\", \"AMYLASE\", \"TSH\", \"T4\", \"T3\", \"CRP\", \"SED\"    Anesthesia Plan    ASA Status:  2    NPO Status:  NPO Appropriate    Anesthesia Type: General.     - Airway: ETT   Induction: Intravenous, RSI.   Maintenance: Balanced.        Consents    Anesthesia Plan(s) and associated risks, benefits, and realistic alternatives discussed. Questions answered and patient/representative(s) expressed understanding.     - Discussed:     - Discussed with:  Patient,             Postoperative Care    Pain management: Multi-modal analgesia.   PONV prophylaxis: Ondansetron (or other 5HT-3), Dexamethasone or Solumedrol     Comments:               Fredy Granda MD    I have reviewed the pertinent notes and labs in the chart from the past 30 days and (re)examined the patient.  Any updates or changes from those notes are reflected in this note.                  "

## 2024-02-27 NOTE — ANESTHESIA PROCEDURE NOTES
Airway       Patient location during procedure: OR       Procedure Start/Stop Times: 2/27/2024 9:41 AM  Staff -        Performed By: anesthesiologist and CRNA  Consent for Airway        Urgency: elective  Indications and Patient Condition       Indications for airway management: priya-procedural       Induction type:RSI       Mask difficulty assessment: 0 - not attempted    Final Airway Details       Final airway type: endotracheal airway       Successful airway: ETT - single  Endotracheal Airway Details        ETT size (mm): 7.0       Cuffed: yes       Successful intubation technique: video laryngoscopy       VL Blade Size: Glidescope 3       Grade View of Cords: 1       Adjucts: stylet       Position: Right       Measured from: gums/teeth       Secured at (cm): 21       Bite block used: None    Post intubation assessment        Placement verified by: capnometry, equal breath sounds and chest rise        Number of attempts at approach: 1       Number of other approaches attempted: 0       Secured with: tape       Ease of procedure: easy       Dentition: Intact and Unchanged    Medication(s) Administered   Medication Administration Time: 2/27/2024 9:41 AM

## 2024-02-27 NOTE — OP NOTE
Procedure Date: February 27, 2024    PATIENT: Yane Courtney  1950     PREOPERATIVE DIAGNOSIS:  Right knee advanced osteoarthritis.     POSTOPERATIVE DIAGNOSIS:  Right knee advanced osteoarthritis.     PROCEDURE:  Right total knee arthroplasty.     SURGEON:  Mu Forrest MD     ASSISTANT:  Marry Cisneros PA-C     COMPLICATIONS:  None.     ESTIMATED BLOOD LOSS:  30 mL.     IMPLANTS:  [employer] system:  1.  Size 4 narrow, right, PS femoral component, cobalt chrome  2.  Size 2, right standard tibial baseplate.  3.  Size 11, 1/2, high flex PS polyethylene.  4.  Size 29 concentric polyethylene patella.     DESCRIPTION OF PROCEDURE:  The patient was brought to the operating room February 27, 2024 for right total knee arthroplasty.  Patient has a history of advanced osteoarthritis of the knee refractory to conservative measures. Patient was seen on the day of surgery in the preoperative holding area.  Right knee marked as the correct operative site.  Received a dose of IV antibiotic less than 30 minutes prior to surgical incision.  Post-surgical antibiotic and DVT prophylaxis are indicated and will be prescribed.  Skilled assistant was used for positioning, draping, retraction, closure, dressing application. Regional block administered by anesthesia.     The patient was brought to the operating room and placed under a general anesthesia.  The operative lower extremity was prepped and draped in the standard sterile fashion.  Preoperative timeout was taken.  Thigh tourniquet inflated to 250 mmHg.  Anterior longitudinal surgical incision was made.  Medial parapatellar arthrotomy was performed.  Advanced tricompartmental osteoarthritis confirmed.  Marginal osteophytes debrided.  Synovectomy performed.  Patella was cut to a thickness of 14 mm and sized to a 29.  Femur prepared with an intramedullary guide cedric and a 4-degree valgus cutting guide.  I took an extra 2 mm of distal femur and sized the cut  distal femur to a 4 narrow.  Prepared in the appropriate rotational alignment.    The tibia was prepared using an extramedullary cutting guide, taking 7 mm of bone and cartilage off the lateral side.  The cut proximal tibia was sized to a 2 and was punched in the appropriate rotational alignment centered on the medial third of the tibial tubercle.  Posterior osteophytes and menisci were removed.  Posterior capsule injected with a cocktail of Toradol, ropivacaine and saline.    With trial components in place and a size 11 trial PS polyethylene, I was pleased with range of motion, stability, and gap balancing.  Trial components were removed.  Jet lavage irrigation performed.  Components listed above were cemented into place using 1 batch of high viscosity Simplex cement without antibiotic.  Once the cement had hardened and all extruded cement removed, I implanted the size 11 PS polyethylene for the 2 baseplate.    Tourniquet deflated.  Hemostasis achieved.  Betadine wash performed.  Jet lavage irrigation performed.  Arthrotomy was closed with interrupted Ethibond sutures over 1 g of vancomycin powder.  Superficial soft tissues irrigated and closed in layers.  A sterile dressing was applied.  The patient was brought to recovery in stable condition.  Needle and lap counts were correct at the end of the case.  There were no known complications.    Additional intraoperative findings of note: None    Special postsurgical patient care factors: None     Mu Forrest MD

## 2024-02-27 NOTE — BRIEF OP NOTE
[facility]    Brief Operative Note    Pre-operative diagnosis: Osteoarthritis of right knee [M17.11]  Post-operative diagnosis Same as pre-operative diagnosis    Procedure: RIGHT  KNEE TOTAL ARTHROPLASTY, Right - Knee    Surgeon: Surgeon(s) and Role:     * Mu Forrest MD - Primary     * Marry Cisneros PA-C - Assisting  Anesthesia: General with Block   Estimated Blood Loss: Less than 50 ml    Drains: None  Specimens:   ID Type Source Tests Collected by Time Destination   A : Bone and tissue Tissue Knee, Right OR DOCUMENTATION ONLY Mu Forrest MD 2/27/2024 10:01 AM      Findings:   None.  Complications: None.  Implants:   Implant Name Type Inv. Item Serial No.  Lot No. LRB No. Used Action   BONE CEMENT RADIOPAQUE SIMPLEX HV FULL DOSE 6194-1-001 - [id number] Cement, Bone BONE CEMENT RADIOPAQUE SIMPLEX HV FULL DOSE 6194-1-001  [employer] [id number] Right 1 Implanted   IMP COMP PATELLA FRANCISCO II [id number] - [id number] Total Joint Component/Insert IMP COMP PATELLA FRANCISCO II [id number]  [employer] [id number] Right 1 Implanted   IMP COMP FEMORAL S&N LEGION PS NP NARROW 4 RT [id number] - [id number] Total Joint Component/Insert IMP COMP FEMORAL S&N LEGION PS NP NARROW 4 RT [id number]  [employer] [id number] Right 1 Implanted   IMP BASEPLATE TIBIAL FRANCISCO II SZ 2 RT TI [id number] - [id number] Total Joint Component/Insert IMP BASEPLATE TIBIAL FRANCISCO II SZ 2 RT TI [id number]  [employer]R [id number] Right 1 Implanted   IMP INSERT TIB S&N LGN PS HI FLEX XLPE SZ1-2 11MM [id number] - [id number] Total Joint Component/Insert IMP INSERT TIB S&N LGN PS HI FLEX XLPE SZ1-2 11MM [id number]  [employer] [id number] Right 1 Implanted

## 2024-02-27 NOTE — CONSULTS
[facility]  Consult Note - Hospitalist Service  Date of Admission:  2/27/2024  Consult Requested by: Mu Forrest M.D.   Reason for Consult: Medical co-management     Assessment & Plan   Yane Courtney is a 73 year old female admitted on 2/27/2024 following a right total knee arthroplasty. POD #0.      S/p Procedure, right total knee arthroplasty  * Procedure completed under general anesthesia by Dr. Mu Forrest and Marry Cisneros PA-C. No complications noted. Less than 50 ml EBL.   - Hgb 13.0 pre-operatively  - Post-operative hgb pending  - Defer post-operative surgical management to primary service including pain management, disposition and therapies    - AM CBC and CMP.    Essential Hypertension   - Hold PTA nifedipine 02/27/2024. Initially planned to resume tomorrow pending blood pressures overnight, although SBP consistently greater than 180. Will start nifedipine this afternoon with hold parameters.     - Hydralazine PRN SBP>180.     Gastroesophageal reflux disease, esophagitis   Nausea, secondary to above   *Patient has significant heartburn daily that requires trips to ED regularly due to nausea. Improves with regular bowel movements. Has been seen by GI for this (most recently 01/2024 via [facility]) and had upper endoscopy in 01/2024 via [facility] that appeared normal, per daughters.   *Of note, patient's last colonoscopy completed in September of 2021 revealed copious quantities of semi solid stool in the ascending colon, cecum and at the appendiceal orifice. Diffuse area of moderate melanosis was found in the entire colon. Small internal hemorrhoids and small mouth diverticula also noted. The terminal ileum appeared normal. Most recent TSH and calcium level were normal. She underwent an upper endoscopy in September of 2022 for acid reflux, which revealed erosive gastropathy with stigmata of recent bleeding. Biopsies showed nonneoplastic gastric oxyntic mucosa without  evidence of gastritis or malignancy. Her right upper quadrant and cross-sectional imaging was unremarkable. She was evaluated by [facility] on 12/2023, recommendations included a gastric emptying scan, switching from lansoprazole to Nexium 40 mg b.i.d. and repeat EGD. Gastric emptying scan was normal. Upper endoscopy completed 01/2024 via [facility] that appeared normal with no evidence of bleed, per daughters.   - Monitor for evidence of GI bleed.    - Bowel regimen important to prevent constipation, which worsens symptoms of nausea and heartburn. Started scheduled miralax and senna daily.   - GI cocktail, zofran and compazine PRN for nausea and heartburn post-operative.  - Scheduled pantoprazole 40 mg BID IV.   - Continue PTA medications (lansoprazole and antacid) upon discharge. Recommend close follow-up with PCP and [facility] for continued management.   - AM CBC and CMP.    Chronic Idiopathic Constipation   - Bowel regimen important to prevent constipation, which worsens symptoms of nausea and heartburn. Started scheduled miralax and senna daily.     Allergic Dermatitis of Unspecified Eye, unspecified eyelid   - Continue olopatadine ophthalmic solution in each affected eye BID.      The patient's care was discussed with the Attending Physician, Dr. Nacho Mead .    Mery Ramso PA-C  Hospitalist Service  Securely message with [employer] (more info)  Text page via [facility] Paging/Directory   ______________________________________________________________________    Chief Complaint   Right total knee arthroplasty     History is obtained from the patient    History of Present Illness   Yane Courtney is a 73 year old female admitted on 2/27/2024 following a right total knee arthroplasty.     Patient is POD #0 s/p right total knee arthroplasty. Procedure completed under general anesthesia by Dr. Mu Forrest and Marry Cisneros PA-C. No complications noted. Less than 50 ml EBL.      I evaluated the patient post-operatively. She is  awake, alert, in no distress. She is laying in bed upon my examination with an emesis bag as she has considerable nausea. Patient reports she has nausea secondary to her GERD chronically and is being seen by GI for her symptoms. Reports she takes lansoprazole 30 mg daily, an antacid, zofran and still presents periodically to the ED for improved management of her symptoms. States she had heartburn prior to the procedure and now more severe. Reports improvement with compazine. Patient denies CP, SOB, palpitations, abdominal pain or POV. Patient states her pain is 9/10 in severity. She is voiding adequately. No flatus or BM to date.     Past Medical History    Past Medical History:   Diagnosis Date    Allergic rhinitis due to pollen     Gastroesophageal reflux disease     Hypertension        Past Surgical History   Past Surgical History:   Procedure Laterality Date    CHOLECYSTECTOMY      EYE SURGERY Bilateral     cataract surgery    IR LUMBAR EPIDURAL STEROID INJECTION  05/01/2014     Medications   I have reviewed this patient's current medications     Allergies   Allergies   Allergen Reactions    Aspirin (Tartrazine Only) [Tartrazine] Unknown      Physical Exam   Vital Signs: Temp: 98.3  F (36.8  C) Temp src: Oral BP: (!) 163/70 Pulse: 91   Resp: 20 SpO2: 100 % O2 Device: Nasal cannula Oxygen Delivery: 2 LPM  Weight: 143 lbs 9.6 oz    GENERAL:  Pleasant, cooperative, alert. Laying in bed with emesis bag. Daughters at bedside. Appears nauseous and occasionally dry-heaving.   HEENT: Normocephalic, atraumatic. Mucous membranes moist.  No erythema; uvula midline.  Neck supple with no adenopathy.   PULMONOLOGY: Clear to auscultation bilaterally.   CARDIAC: Regular rate and rhythm.   ABDOMEN: Soft, nontender non distended.   MUSCULOSKELETAL:  Moving x 4 spontaneously. CMS intact. Right knee with dressing c/d/i.    NEURO: Alert and oriented x3. Nonfocal exam.    Medical Decision Making       35 MINUTES SPENT BY ME on the  date of service doing chart review, history, exam, documentation & further activities per the note.      Data   ------------------------- PAST 24 HR DATA REVIEWED -----------------------------------------------    I have personally reviewed the following data over the past 24 hrs:    N/A  \   N/A   / N/A     N/A N/A N/A /  120 (H)   3.4 N/A 0.64 \       Imaging results reviewed over the past 24 hrs:   Recent Results (from the past 24 hour(s))   XR Knee Port Right 1/2 Views    Narrative    KNEE PORTABLE RIGHT 1/2 VIEWS   2/27/2024 11:48 AM     HISTORY:  Postop total knee.    COMPARISON: None.      Impression    IMPRESSION: Recent total knee arthroplasty with adjacent gas.  Excellent position and alignment of components. No evidence of  complication or periprosthetic fracture.    GEORGE ARCHER MD         SYSTEM ID:  [id number]

## 2024-02-27 NOTE — PROVIDER NOTIFICATION
MD Notification    Notified Person: MD    Notified Person Name: ZOEY Ramos PA    Notification Date/Time: 1730, 2/27/24    Notification Interaction: page    Purpose of Notification: pt is dry heaving again, gave protonix and Gi cocktail, should I give the prn GI cocktail next? Zofran and compazine unavailable    Orders Received: give PRN GI cocktail again, ordered meclizine. Tele ordered as well    Comments:

## 2024-02-27 NOTE — PROGRESS NOTES
Ortho Postop Check  R TKA    Nauseous  Pain well managed  AVSS  CMS intact  Dressing CDI  PT  Resume Elaquis tomorrow  Home tomorrow with family pending AM PT clearance    Mu Forrest MD

## 2024-02-27 NOTE — PROGRESS NOTES
PTA medications completed by Medication Scribe day of surgery     Medication history sources: Patient, Patient's family/friend (Daughter), and ()  In the past week, patient estimated taking medication this percent of the time: Greater than 90%      Significant changes made to the medication list:  Patient reports no longer taking the following meds (med scribe removed from PTA med list): Tobradex, Toprol XL      Additional medication history information:   None    Medication reconciliation completed by provider prior to medication history? No    Time spent in this activity: 70 minutes    The information provided in this note is only as accurate as the sources available at the time of update(s)  Prior to Admission medications    Medication Sig Last Dose Taking? Auth Provider Long Term End Date   acetaminophen (TYLENOL) 325 MG tablet Take 2 tablets (650 mg) by mouth every 4 hours as needed for other (mild pain)  Yes Mu Forrest MD     acetaminophen (TYLENOL) 500 MG tablet Take 500-1,000 mg by mouth every 6 hours as needed for mild pain Unknown at prn Yes Reported, Patient     apixaban ANTICOAGULANT (ELIQUIS) 5 MG tablet Take 5 mg by mouth 2/5/2024 at pm Yes Reported, Patient     aspirin 81 MG EC tablet Take 1 tablet (81 mg) by mouth 2 times daily  Yes Mu Forrest MD     atorvastatin (LIPITOR) 20 MG tablet Take 20 mg by mouth 2/26/2024 at am Yes Reported, Patient Yes    calcium carbonate (TUMS) 500 MG chewable tablet Take 1 chew tab by mouth 2 times daily as needed for heartburn 2/27/2024 at am Yes Reported, Patient     cycloSPORINE (RESTASIS) 0.05 % ophthalmic emulsion Place 1 drop into both eyes 2 times daily 2/26/2024 at pm Yes Reported, Patient     gabapentin (NEURONTIN) 300 MG capsule Take 300 mg by mouth 3 times daily 2/26/2024 at pm Yes Reported, Patient Yes    HYDROmorphone (DILAUDID) 2 MG tablet Take 1-2 tablets (2-4 mg) by mouth every 4 hours as needed for moderate to severe pain  Yes  Mu Forrest MD     hydrOXYzine HCl (ATARAX) 10 MG tablet Take 1 tablet (10 mg) by mouth every 6 hours as needed for itching or anxiety (with pain, moderate pain)  Yes Mu Forrest MD     LANsoprazole (PREVACID) 30 MG DR capsule Take 30 mg by mouth 2 times daily 2/27/2024 at 0330 Yes Reported, Patient     Magnesium Hydroxide (MILK OF MAGNESIA PO) Take 15 mLs by mouth every evening 2/26/2024 at pm Yes Reported, Patient     metoclopramide (REGLAN) 10 MG tablet Take 10 mg by mouth 4 times daily as needed 2/27/2024 at am Yes Reported, Patient     multivitamin, therapeutic (THERA-VIT) TABS tablet Take 1 tablet by mouth daily 2/26/2024 at pm Yes Reported, Patient     neomycin-polymixin-dexamethasone (MAXITROL) ophthalmic ointment Place into both eyes at bedtime 2/26/2024 at HS Yes Reported, Patient     NIFEdipine ER OSMOTIC (PROCARDIA XL) 60 MG 24 hr tablet Take 60 mg by mouth 2/27/2024 at am Yes Reported, Patient Yes    oxymetazoline (AFRIN) 0.05 % nasal spray Spray 2 sprays into both nostrils 2 times daily 2/26/2024 at pm Yes Reported, Patient     prochlorperazine (COMPAZINE) 10 MG tablet Take 0.5 tablets (5 mg) by mouth every 6 hours as needed for nausea or vomiting 2/27/2024 at am Yes Luis Forrest,      senna-docusate (SENOKOT-S/PERICOLACE) 8.6-50 MG tablet Take 1-2 tablets by mouth 2 times daily Take while on oral narcotics to prevent or treat constipation.  Yes uM Forrest MD     simethicone (MYLICON) 80 MG chewable tablet Take 80 mg by mouth every 6 hours as needed for flatulence or cramping 2/26/2024 at pm Yes Reported, Patient     Vitamin D3 (CHOLECALCIFEROL) 125 MCG (5000 UT) tablet Take 1 tablet by mouth daily 2/26/2024 at pm Yes Reported, Patient         Medication history completed by: Taty Jones LPN

## 2024-02-27 NOTE — PROGRESS NOTES
24 1600   Appointment Info   Signing Clinician's Name / Credentials (PT) Jocelin Nur, DPMARLON   Quick Adds   Quick Adds Certification       Present yes   Language Faroese via vocera, then battery  so used family. Per RN, family still needs to sign waiver   Living Environment   People in Home alone   Current Living Arrangements house   Home Accessibility no concerns   Transportation Anticipated family or friend will provide   Living Environment Comments Lives alone in town\home no BIJAN. 2 dtrs present providing hx as pt too naseated to talk. Dtrs will be able to stay with her until she is recovered. Walk in shower.   Self-Care   Usual Activity Tolerance good   Current Activity Tolerance moderate   Regular Exercise Yes   Activity/Exercise Type biking;walking   Exercise Amount/Frequency daily   Equipment Currently Used at Home cane, straight;walker, rolling;shower chair;grab bar, toilet   Fall history within last six months no   Activity/Exercise/Self-Care Comment IND with ADLS, dtr cooks/cleans/drives. Dtr is caretaker   General Information   Onset of Illness/Injury or Date of Surgery 24   Referring Physician Mu Forrest MD   Patient/Family Therapy Goals Statement (PT) Go home   Pertinent History of Current Problem (include personal factors and/or comorbidities that impact the POC) SDS R TKA   Existing Precautions/Restrictions fall;weight bearing   Weight-Bearing Status - RLE weight-bearing as tolerated   Cognition   Affect/Mental Status (Cognition) low arousal/lethargic   Orientation Status (Cognition) unable/difficult to assess   Follows Commands (Cognition) follows one-step commands   Pain Assessment   Patient Currently in Pain Yes, see Vital Sign flowsheet  (knee)   Integumentary/Edema   Integumentary/Edema Comments knee wrap in tact   Posture    Posture Forward head position;Kyphosis   Range of Motion (ROM)   Range of Motion ROM deficits secondary to pain;ROM deficits  secondary to surgical procedure;ROM deficits secondary to swelling;ROM deficits secondary to weakness   Strength (Manual Muscle Testing)   Strength (Manual Muscle Testing) Deficits observed during functional mobility   Bed Mobility   Comment, (Bed Mobility) Adrian FWW   Transfers   Comment, (Transfers) Adrian sit<>Stand   Gait/Stairs (Locomotion)   Comment, (Gait/Stairs) step pivot to chair   Clinical Impression   Criteria for Skilled Therapeutic Intervention Yes, treatment indicated   PT Diagnosis (PT) impaired   Influenced by the following impairments pain, weakness, fear avoidance   Functional limitations due to impairments impaired mobility   Clinical Presentation (PT Evaluation Complexity) stable   Clinical Presentation Rationale clinical judgement   Clinical Decision Making (Complexity) low complexity   Planned Therapy Interventions (PT) balance training;bed mobility training;gait training;home exercise program;stair training;strengthening;stretching;transfer training   Risk & Benefits of therapy have been explained evaluation/treatment results reviewed;care plan/treatment goals reviewed;risks/benefits reviewed;current/potential barriers reviewed;participants voiced agreement with care plan;participants included;patient   PT Total Evaluation Time   PT Eval, Low Complexity Minutes (07361) 15   Therapy Certification   Start of care date 02/27/24   Certification date from 02/27/24   Certification date to 03/05/24   Physical Therapy Goals   PT Frequency Daily   PT Predicted Duration/Target Date for Goal Attainment 03/05/24   PT Goals Bed Mobility;Transfers;Gait   PT: Bed Mobility Minimal assist;Rolling;Bridging;Supine to/from sit   PT: Transfers Minimal assist;Bed to/from chair;Assistive device;Within precautions   PT: Gait Minimal assist;Assistive device   Interventions   Interventions Quick Adds Gait Training;Therapeutic Activity;Therapeutic Procedure   Therapeutic Procedure/Exercise   Treatment Detail/Skilled  Intervention Handout provided for HEP. Trialed quad set x1, pt continued to dry heave so will defer further exercise.   Therapeutic Activity   Therapeutic Activities: dynamic activities to improve functional performance Minutes (82571) 25   Symptoms Noted During/After Treatment Fatigue;Increased pain   Treatment Detail/Skilled Intervention Pt greeted bedside, agreeable for therapy. Nauseated, RN called to bedside. Dtrs presdent to assist with hx d/t pt's low arousal and nausea. Edu on WBAT, exercise recs and role of therapy. Time spent answering questions with in scope of practice. Adrian for bed mobility, nasusated at EOB. Sit,>Stand Adrian, weight shifting onto R leg. After amb, pt returned to recliner with family present. Edu on ice schedule.   Gait Training   Gait Training Minutes (43243) 5   Symptoms Noted During/After Treatment (Gait Training) increased pain;shortness of breath   Treatment Detail/Skilled Intervention Amb 3ft bed to chair Adrian with FWW, cues for path findinging and managing equipment. Pt dry heaving throughout so defered further mobility   PT Discharge Planning   PT Plan progress gait as able, check with RN for nausea meds if necessary   PT Rationale for DC Rec Pt moving Adrian with FWW limited by nausea and pain. Anticipate if nausea is better controlled than pt can DC home with assist from dtr and HCPT. Family was asking about TCU though pt will have 24/7 assist from home and lives on 1 level, no BIJAN. Unless pt has medical needs for TCU pt can likely DC home with assist.   PT Brief overview of current status Adrian FWW   PT Equipment Needed at Discharge walker, rolling   Total Session Time   Timed Code Treatment Minutes 30   Total Session Time (sum of timed and untimed services) 45   M [facility]  OUTPATIENT PHYSICAL THERAPY EVALUATION  PLAN OF TREATMENT FOR OUTPATIENT REHABILITATION  (COMPLETE FOR INITIAL CLAIMS ONLY)  Patient's Last Name, First Name, M.I.  Date of  Birth:  1950  Yane Courtney  RAFI                        Provider's Name  [facility] Medical Record No.  [id number]                             Onset Date:  02/27/24   Start of Care Date:  02/27/24   Type:     _X_PT   ___OT   ___SLP Medical Diagnosis:                 PT Diagnosis:  impaired Visits from SOC:  1     See note for plan of treatment, functional goals and certification details    I CERTIFY THE NEED FOR THESE SERVICES FURNISHED UNDER        THIS PLAN OF TREATMENT AND WHILE UNDER MY CARE     (Physician co-signature of this document indicates review and certification of the therapy plan).

## 2024-02-27 NOTE — PROVIDER NOTIFICATION
MD Notification    Notified Person: MD    Notified Person Name: BenyZOEY wayne PA    Notification Date/Time: 1415, 2/27/24    Notification Interaction: page    Purpose of Notification: pt is very nauseous and dry heaving, gave zofran and compazine. Family states that pt have very bad GERD, can we get anything else on hand to help with GERD?     Orders Received: GI cocktail ordered, IV Protonix    Comments:

## 2024-02-27 NOTE — ANESTHESIA CARE TRANSFER NOTE
Patient: Yane Courtney    Procedure: Procedure(s):  RIGHT  KNEE TOTAL ARTHROPLASTY       Diagnosis: Osteoarthritis of right knee [M17.11]  Diagnosis Additional Information: No value filed.    Anesthesia Type:   General     Note:    Oropharynx: oropharynx clear of all foreign objects  Level of Consciousness: drowsy  Oxygen Supplementation: face mask  Level of Supplemental Oxygen (L/min / FiO2): 6  Independent Airway: airway patency satisfactory and stable  Dentition: dentition unchanged  Vital Signs Stable: post-procedure vital signs reviewed and stable  Report to RN Given: handoff report given  Patient transferred to: PACU    Handoff Report: Identifed the Patient, Identified the Reponsible Provider, Reviewed the pertinent medical history, Discussed the surgical course, Reviewed Intra-OP anesthesia mangement and issues during anesthesia, Set expectations for post-procedure period and Allowed opportunity for questions and acknowledgement of understanding        Electronically Signed By: RAMON Romero CRNA  February 27, 2024  11:27 AM

## 2024-02-27 NOTE — ANESTHESIA POSTPROCEDURE EVALUATION
Patient: Yane Courtney    Procedure: Procedure(s):  RIGHT  KNEE TOTAL ARTHROPLASTY       Anesthesia Type:  General    Note:  Disposition: Inpatient   Postop Pain Control: Uneventful            Sign Out: Well controlled pain   PONV: No   Neuro/Psych: Uneventful            Sign Out: Acceptable/Baseline neuro status   Airway/Respiratory: Uneventful            Sign Out: Acceptable/Baseline resp. status   CV/Hemodynamics: Uneventful            Sign Out: Acceptable CV status; No obvious hypovolemia; No obvious fluid overload   Other NRE: NONE   DID A NON-ROUTINE EVENT OCCUR? No           Last vitals:  Vitals Value Taken Time   /62 02/27/24 1230   Temp 37.2  C (98.9  F) 02/27/24 1124   Pulse 94 02/27/24 1241   Resp 31 02/27/24 1241   SpO2 99 % 02/27/24 1241   Vitals shown include unfiled device data.    Electronically Signed By: Curt Forrest DO  February 27, 2024  2:04 PM

## 2024-02-27 NOTE — ANESTHESIA PROCEDURE NOTES
Adductor canal and Femoral Procedure Note    Pre-Procedure   Staff -        Anesthesiologist:  Fredy Granda MD       Performed By: anesthesiologist       Location: pre-op       Pre-Anesthestic Checklist: patient identified, IV checked, site marked, risks and benefits discussed, informed consent, monitors and equipment checked, pre-op evaluation, at physician/surgeon's request and post-op pain management  Timeout:       Correct Patient: Yes        Correct Procedure: Yes        Correct Site: Yes        Correct Position: Yes        Correct Laterality: Yes        Site Marked: Yes  Procedure Documentation  Procedure: Adductor canal, Femoral       Laterality: right       Patient Position: supine       Patient Prep/Sterile Barriers: sterile gloves, mask, patient draped       Skin prep: Chloraprep       Local skin infiltrated with 2 mL of 1% lidocaine.        Needle Type: insulated       Ultrasound guided       1. Ultrasound was used to identify targeted nerve, plexus, vascular marker, or fascial plane and place a needle adjacent to it in real-time.       2. Ultrasound was used to visualize the spread of anesthetic in close proximity to the above referenced structure.       3. A permanent image is entered into the patient's record.       4. The visualized anatomic structures appeared normal.       5. There were no apparent abnormal pathologic findings.    Assessment/Narrative         The placement was negative for: blood aspirated, painful injection and site bleeding       Paresthesias: No.       Bolus given via needle..        Secured via.        Insertion/Infusion Method: Single Shot       Complications: none       Injection made incrementally with aspirations every 5 mL.    Medication(s) Administered   Bupivacaine 0.5% w/ 1:400K Epi (Injection) - Injection   15 mL - 2/27/2024 9:55:00 AM   Comments:  Patient tolerated the procedure well.    The surgeon has given a verbal order transferring care of this patient to  "me for the performance of a regional analgesia block for post-op pain control. It is requested of me because I am uniquely trained and qualified to perform this block and the surgeon is neither trained nor qualified to perform this procedure.Ultrasound Interpretation, peripheral nerve block.        FOR [facility] ([facility] [place]:   Pain Team Contact information: please page the Pain Team Via [employer]. Search \"Pain\". During daytime hours, please page the attending first. At night please page the resident first.      "

## 2024-02-27 NOTE — PROVIDER NOTIFICATION
MD Notification    Notified Person: MD    Notified Person Name: ZOEY Ramos    Notification Date/Time: 1700, 2/27/24    Notification Interaction: page    Purpose of Notification: FYI BPs are high, last was 176/80. Will give ordered Nifedipine and monitor   Orders Received: PRN hydralazine available following Nefedipine if SBP > 180    Comments:

## 2024-02-28 ENCOUNTER — APPOINTMENT (OUTPATIENT)
Dept: PHYSICAL THERAPY | Facility: CLINIC | Age: 74
End: 2024-02-28
Attending: ORTHOPAEDIC SURGERY
Payer: COMMERCIAL

## 2024-02-28 ENCOUNTER — APPOINTMENT (OUTPATIENT)
Dept: OCCUPATIONAL THERAPY | Facility: CLINIC | Age: 74
End: 2024-02-28
Attending: ORTHOPAEDIC SURGERY
Payer: COMMERCIAL

## 2024-02-28 VITALS
WEIGHT: 143.6 LBS | OXYGEN SATURATION: 95 % | BODY MASS INDEX: 23.93 KG/M2 | HEART RATE: 86 BPM | RESPIRATION RATE: 18 BRPM | TEMPERATURE: 98.5 F | HEIGHT: 65 IN | DIASTOLIC BLOOD PRESSURE: 74 MMHG | SYSTOLIC BLOOD PRESSURE: 168 MMHG

## 2024-02-28 LAB
ALBUMIN SERPL BCG-MCNC: 3.9 G/DL (ref 3.5–5.2)
ALP SERPL-CCNC: 87 U/L (ref 40–150)
ALT SERPL W P-5'-P-CCNC: 21 U/L (ref 0–50)
ANION GAP SERPL CALCULATED.3IONS-SCNC: 10 MMOL/L (ref 7–15)
AST SERPL W P-5'-P-CCNC: 25 U/L (ref 0–45)
BILIRUB SERPL-MCNC: 0.3 MG/DL
BUN SERPL-MCNC: 10.3 MG/DL (ref 8–23)
CALCIUM SERPL-MCNC: 9.3 MG/DL (ref 8.8–10.2)
CHLORIDE SERPL-SCNC: 103 MMOL/L (ref 98–107)
CREAT SERPL-MCNC: 0.61 MG/DL (ref 0.51–0.95)
DEPRECATED HCO3 PLAS-SCNC: 28 MMOL/L (ref 22–29)
EGFRCR SERPLBLD CKD-EPI 2021: >90 ML/MIN/1.73M2
ERYTHROCYTE [DISTWIDTH] IN BLOOD BY AUTOMATED COUNT: 14.4 % (ref 10–15)
GLUCOSE SERPL-MCNC: 152 MG/DL (ref 70–99)
HCT VFR BLD AUTO: 35.1 % (ref 35–47)
HGB BLD-MCNC: 11.6 G/DL (ref 11.7–15.7)
MCH RBC QN AUTO: 29.7 PG (ref 26.5–33)
MCHC RBC AUTO-ENTMCNC: 33 G/DL (ref 31.5–36.5)
MCV RBC AUTO: 90 FL (ref 78–100)
PLATELET # BLD AUTO: 301 10E3/UL (ref 150–450)
POTASSIUM SERPL-SCNC: 3.9 MMOL/L (ref 3.4–5.3)
PROT SERPL-MCNC: 6.7 G/DL (ref 6.4–8.3)
RBC # BLD AUTO: 3.91 10E6/UL (ref 3.8–5.2)
SODIUM SERPL-SCNC: 141 MMOL/L (ref 135–145)
WBC # BLD AUTO: 11.7 10E3/UL (ref 4–11)

## 2024-02-28 PROCEDURE — 250N000013 HC RX MED GY IP 250 OP 250 PS 637: Performed by: ORTHOPAEDIC SURGERY

## 2024-02-28 PROCEDURE — 250N000013 HC RX MED GY IP 250 OP 250 PS 637

## 2024-02-28 PROCEDURE — 250N000011 HC RX IP 250 OP 636

## 2024-02-28 PROCEDURE — 250N000011 HC RX IP 250 OP 636: Performed by: ORTHOPAEDIC SURGERY

## 2024-02-28 PROCEDURE — 97116 GAIT TRAINING THERAPY: CPT | Mod: GP | Performed by: PHYSICAL THERAPIST

## 2024-02-28 PROCEDURE — C9113 INJ PANTOPRAZOLE SODIUM, VIA: HCPCS

## 2024-02-28 PROCEDURE — 99214 OFFICE O/P EST MOD 30 MIN: CPT | Performed by: PHYSICIAN ASSISTANT

## 2024-02-28 PROCEDURE — 258N000003 HC RX IP 258 OP 636: Performed by: ORTHOPAEDIC SURGERY

## 2024-02-28 PROCEDURE — 85027 COMPLETE CBC AUTOMATED: CPT

## 2024-02-28 PROCEDURE — 97535 SELF CARE MNGMENT TRAINING: CPT | Mod: GO | Performed by: OCCUPATIONAL THERAPIST

## 2024-02-28 PROCEDURE — 82040 ASSAY OF SERUM ALBUMIN: CPT

## 2024-02-28 PROCEDURE — 36415 COLL VENOUS BLD VENIPUNCTURE: CPT

## 2024-02-28 PROCEDURE — 97530 THERAPEUTIC ACTIVITIES: CPT | Mod: GP | Performed by: PHYSICAL THERAPIST

## 2024-02-28 PROCEDURE — 97165 OT EVAL LOW COMPLEX 30 MIN: CPT | Mod: GO | Performed by: OCCUPATIONAL THERAPIST

## 2024-02-28 RX ADMIN — HYDROMORPHONE HYDROCHLORIDE 4 MG: 2 TABLET ORAL at 05:42

## 2024-02-28 RX ADMIN — DOCUSATE SODIUM 50 MG AND SENNOSIDES 8.6 MG 1 TABLET: 8.6; 5 TABLET, FILM COATED ORAL at 07:51

## 2024-02-28 RX ADMIN — ACETAMINOPHEN 975 MG: 325 TABLET, FILM COATED ORAL at 05:42

## 2024-02-28 RX ADMIN — NIFEDIPINE 60 MG: 60 TABLET, FILM COATED, EXTENDED RELEASE ORAL at 07:51

## 2024-02-28 RX ADMIN — CARBOXYMETHYLCELLULOSE SODIUM 1 DROP: 5 SOLUTION/ DROPS OPHTHALMIC at 07:51

## 2024-02-28 RX ADMIN — POLYETHYLENE GLYCOL 3350 17 G: 17 POWDER, FOR SOLUTION ORAL at 07:51

## 2024-02-28 RX ADMIN — PANTOPRAZOLE SODIUM 40 MG: 40 INJECTION, POWDER, FOR SOLUTION INTRAVENOUS at 05:42

## 2024-02-28 RX ADMIN — SODIUM CHLORIDE, POTASSIUM CHLORIDE, SODIUM LACTATE AND CALCIUM CHLORIDE: 600; 310; 30; 20 INJECTION, SOLUTION INTRAVENOUS at 02:28

## 2024-02-28 RX ADMIN — HYDROXYZINE HYDROCHLORIDE 10 MG: 10 TABLET ORAL at 08:46

## 2024-02-28 RX ADMIN — CEFAZOLIN 1 G: 1 INJECTION, POWDER, FOR SOLUTION INTRAMUSCULAR; INTRAVENOUS at 02:28

## 2024-02-28 RX ADMIN — ATORVASTATIN CALCIUM 20 MG: 20 TABLET, FILM COATED ORAL at 07:51

## 2024-02-28 RX ADMIN — HYDROMORPHONE HYDROCHLORIDE 4 MG: 2 TABLET ORAL at 09:31

## 2024-02-28 RX ADMIN — APIXABAN 2.5 MG: 2.5 TABLET, FILM COATED ORAL at 07:51

## 2024-02-28 ASSESSMENT — ACTIVITIES OF DAILY LIVING (ADL)
ADLS_ACUITY_SCORE: 30
ADLS_ACUITY_SCORE: 27
ADLS_ACUITY_SCORE: 30
ADLS_ACUITY_SCORE: 27
ADLS_ACUITY_SCORE: 30
ADLS_ACUITY_SCORE: 27

## 2024-02-28 NOTE — PLAN OF CARE
Physical Therapy Discharge Summary    Reason for therapy discharge:    Discharged to home with home therapy.    Progress towards therapy goal(s). See goals on Care Plan in [facility] electronic health record for goal details.  Goals not met.  Barriers to achieving goals:   discharge from facility.    Therapy recommendation(s):    Continued therapy is recommended.  Rationale/Recommendations:  Pt will benefit from Home PT in order to progress knee ROM/strength and independence with mobility.

## 2024-02-28 NOTE — PLAN OF CARE
POD 0 right total knee   Orientation/Cognitive: A/Ox4, [place] speaking  Mobility Level/Assist Equipment: Ax1 Gb and walker  Fall Risk (Y/N): yes  Behavior Concerns: none  Pain Management: prn Iv dilaudid given x1, scheduled tylenol  Tele/VS/O2: Pt on tele: NSR  , VSS on RA ex HTN  ABNL Lab/BG: N/A  Diet: Reg but pt nauseous   Bowel/Bladder: voiding in BSC,   Skin Concerns: right knee incision CDI  Drains/Devices: Piv infusing LR @ 85  Tests/Procedures for next shift: ortho following, Pt following  Anticipated DC date & active delays: tbd

## 2024-02-28 NOTE — PROGRESS NOTES
[facility]  Hospitalist Progress Note    Assessment & Plan   Yane Courtney is a 73 year old female, w a PMH of Knee OA, HTN, GERD, nausea/constipation/freq visits.  Bowel regimen., who was admitted on 2/27/2024 for elective R TKA on 2/27 by Dr Forrest.     Principal Problem:    S/P total knee arthroplasty, right  Active Problems:    Gastroesophageal reflux disease without esophagitis    Essential hypertension, benign    Dyslipidemia    History of CVA (cerebrovascular accident)    Hx of traumatic brain injury    Idiopathic peripheral neuropathy    Lumbar disc herniation with radiculopathy    Mild aortic stenosis    Mild mitral stenosis    PAF (paroxysmal atrial fibrillation) (H)    Pure hypercholesterolemia    Principal Problem:  Right knee pain,S/p 2/27, right total knee arthroplasty  * Procedure completed under general anesthesia by Dr. Mu Forrest and Marry Cisneros PA-C. No complications noted. Less than 50 ml EBL.  2/28-pain is well-controlled.  PT verbally told me okay to go home with home PT.  No contraindications from hospitalist group.- Hgb 13.0 pre-operatively  - Post-operative hgb pending  - Defer post-operative surgical management to primary service including pain management, disposition and therapies    - AM CBC and CMP.     Essential Hypertension   2/28 /74, reasonable for postop status/resume nifedipine.  Follow-up patient.  -Initially held PTA nifedipine 02/27/24.SBP consistently greater than 180. restart nifedipine 2/27 afternoon with hold parameters.     - Hydralazine PRN SBP>180.   -See primary care in 3 weeks for recheck on sleep and blood pressure.     Gastroesophageal reflux disease, esophagitis   Nausea, secondary to above   *Patient has significant heartburn daily that requires trips to ED regularly due to nausea. Improves with regular bowel movements. Has been seen by GI for this (most recently 01/2024 via [facility]) and had upper endoscopy in  01/2024 via [facility] that appeared normal, per daughters.   *Of note, patient's last colonoscopy completed in September of 2021 revealed copious quantities of semi solid stool in the ascending colon, cecum and at the appendiceal orifice. Diffuse area of moderate melanosis was found in the entire colon. Small internal hemorrhoids and small mouth diverticula also noted. The terminal ileum appeared normal. Most recent TSH and calcium level were normal. She underwent an upper endoscopy in September of 2022 for acid reflux, which revealed erosive gastropathy with stigmata of recent bleeding. Biopsies showed nonneoplastic gastric oxyntic mucosa without evidence of gastritis or malignancy. Her right upper quadrant and cross-sectional imaging was unremarkable. She was evaluated by [facility] on 12/2023, recommendations included a gastric emptying scan, switching from lansoprazole to Nexium 40 mg b.i.d. and repeat EGD. Gastric emptying scan was normal. Upper endoscopy completed 01/2024 via [facility] that appeared normal with no evidence of bleed, per daughters. 2/28-no nausea nor vomiting, able to eat.  No acute changes needed  - Monitor for evidence of GI bleed.    - Bowel regimen important to prevent constipation, which worsens symptoms of nausea and heartburn. Started scheduled miralax and senna daily.   - GI cocktail, zofran and compazine PRN for nausea and heartburn post-operative.  - Scheduled pantoprazole 40 mg BID IV.   - Continue PTA medications (lansoprazole and antacid) upon discharge.  -Recommend close follow-up with PCP and [facility] for continued management.      Chronic Idiopathic Constipation   - Bowel regimen important to prevent constipation, which worsens symptoms of nausea and heartburn. Started scheduled miralax and senna daily.   -2/28 no bowel movement yet.  Should continue MiraLAX and senna.  Follow-up outpatient.     Allergic Dermatitis of Unspecified Eye, unspecified eyelid   2/28 no acute symptoms  - Continue  olopatadine ophthalmic solution in each affected eye BID.        Clinically Significant Risk Factors Present on Admission               # Drug Induced Coagulation Defect: home medication list includes an anticoagulant medication    # Hypertension: Noted on problem list                   NUTRITION Discharge Instruction - Regular Diet Adult  Regular Diet Adult     DVT Prophylaxis: Defer to primary service   Lozada Catheter: Not present  Lines: None     Cardiac Monitoring: ACTIVE order. Indication: Other  Code Status: Full Code      FAMILY COMMUNICATION: Discussed with son and daughter both bedside discussion,     DISCHARGE PLANNING: Per orthopedics, possibly today versus tomorrow.  Disposition Plan       Expected Discharge Date: 02/28/2024, 12:00 PM    Destination: home with family        Entered: Patrice Bacon PA-C 02/28/2024, 8:03 AM        Pt was staffed and discussed w Dr Horta,.who concurs w the above treatment plan.    Time - I spent 30 minutes w greater than 50% spent face to face counseling and including coordination of care    Patrice Bacon PA-C  Hospitalist  [facility]  Securely message with the Vocera Web Console (learn more here)  Text page via [employer] Paging/Directory      Interval History   C/c improving right knee pain  -Main history from daughter and son in the room.  Patient is relatively quiet.  No formal  wanted per either    -Patient reports improved right knee pain.  Does not have any other obvious symptoms.  States that she has been able eat without difficulties.  Has not had any nausea nor vomiting.  Has not had a bowel movement yet.  No other new symptoms were endorsed.    -Patient reports that she and family wants her to get hydroxyzine which helped her sleep last night.  That is okay to use briefly postop, for insomnia but I would not recommend it for pain control as it usually does n and home use of 10 mg of melatonin was not helpful.  Ot help.   I did encourage him however to see their primary care in 2 to 3 weeks if she is still needing an adjunct to help her sleep since this sounds like more of a chronic problem.    -    Physical Exam   Temp: 98.5  F (36.9  C) Temp src: Oral BP: (!) 168/74 (Nurse notified) Pulse: 86   Resp: 18 SpO2: 95 % O2 Device: None (Room air) Oxygen Delivery: 2 LPM  Vitals:    02/27/24 0753   Weight: 65.1 kg (143 lb 9.6 oz)     I/O last 3 completed shifts:  In: 1000 [I.V.:1000]  Out: 1920 [Urine:1900; Blood:20]  Vital Signs with Ranges  Temp:  [98.1  F (36.7  C)-98.9  F (37.2  C)] 98.5  F (36.9  C)  Pulse:  [82-99] 86  Resp:  [15-25] 18  BP: (149-187)/(56-82) 168/74  SpO2:  [95 %-100 %] 95 %  Vitals:    02/27/24 1748 02/27/24 1842 02/27/24 2144 02/28/24 0730   BP: (!) 173/82  (!) 159/69 (!) 168/74   BP Location:   Right arm Right arm   Patient Position:   Semi-Groves's    Pulse: 93 92 97 86   Resp:  22 20 18   Temp:   98.1  F (36.7  C) 98.5  F (36.9  C)   TempSrc:   Oral Oral   SpO2:  98% 96% 95%   Weight:       Height:           Lines: PIV,   Constitutional: Awake, alert, cooperative, no apparent distress.    ENT: Normocephalic,  Neck: no JVD   Pulmonary: on RA, No increased work of breathing, good air exchange, clear to auscultation bilaterally, no crackles or wheezing.  Cardiovascular: Regular rate and rhythm, normal S1 and S2, and no murmur noted.  GI:soft, non-distended, non-tender.  : no garcia  Extremities: Right knee bandaged, trace edema just above it, not unwrapped, otherwise appears to MAEW, No lower extremity edema noted,   Skin/Integumen: Visualized skin appeared clear w/o infectious signs.  Neuro: Nonfocal,   Psych:  Alert, quiet affect.    Meds:  Medications    lactated ringers 85 mL/hr at 02/28/24 0228      acetaminophen  975 mg Oral Q8H    apixaban ANTICOAGULANT  2.5 mg Oral BID    atorvastatin  20 mg Oral Daily    carboxymethylcellulose PF  1 drop Both Eyes BID    NIFEdipine ER OSMOTIC  60 mg Oral Daily     pantoprazole  40 mg Intravenous BID AC    polyethylene glycol  17 g Oral Daily    senna-docusate  1 tablet Oral BID    sodium chloride (PF)  3 mL Intracatheter Q8H       Data     Data reviewed today: I reviewed all new labs and imaging results over the last 24 hours.   I personally reviewed no images or EKG's today.    CBC with Diff:  No lab results found.     Comprehensive Metabolic Panel:  Recent Labs   Lab 02/27/24  0731   POTASSIUM 3.4   CR 0.64   *       XR Knee Port Right 1/2 Views  Result Date: 2/27/2024  KNEE PORTABLE RIGHT 1/2 VIEWS   2/27/2024 11:48 AM HISTORY:  Postop total knee. COMPARISON: None.   IMPRESSION: Recent total knee arthroplasty with adjacent gas. Excellent position and alignment of components. No evidence of complication or periprosthetic fracture. GEORGE ARCHER MD   SYSTEM ID:  [id number]      ------------------------- PAST 24 HR DATA REVIEWED -----------------------------------------------    I have personally reviewed the following data over the past 24 hrs:    11.7 (H)  \   11.6 (L)   / 301     141 103 10.3 /  152 (H)   3.9 28 0.61 \     ALT: 21 AST: 25 AP: 87 TBILI: 0.3   ALB: 3.9 TOT PROTEIN: 6.7 LIPASE: N/A         Medical Decision Making       Please see A&P for additional details of medical decision making.  MANAGEMENT DISCUSSED with the following over the past 24 hours:            The above form was partially completed using Dragon voice dictation software.  At times inadvertent word substitutions or word omissions may occur, that are not seen on initial proof read.    Should any part of the documentation be unclear, or contradictory, the reader is encouraged to please contact me for clarification. Also this is intended as a direct peer to peer communication with abbreviations and verbiage that may appear to be 'direct' as to succinctly relay medical opinions.

## 2024-02-28 NOTE — PROGRESS NOTES
02/28/24 0833   Appointment Info   Signing Clinician's Name / Credentials (OT) Elisabet Host, OTR/L   Quick Adds   Quick Adds Certification       Present yes   Language [place] citizen. Son interpreted   Living Environment   People in Home alone   Current Living Arrangements house   Home Accessibility no concerns   Transportation Anticipated family or friend will provide   Living Environment Comments Pt will be staying w/ daughters who will be providing 24/7 assist. Walk in shower w/ grab bars, shower  seat.   Self-Care   Usual Activity Tolerance good   Equipment Currently Used at Home grab bar, tub/shower;shower chair   Fall history within last six months no   Activity/Exercise/Self-Care Comment Independent w/ ADLs. Daughter assists w/ IADLs.   General Information   Onset of Illness/Injury or Date of Surgery 02/27/24   Referring Physician Mu Forrest MD   Additional Occupational Profile Info/Pertinent History of Current Problem s/p R TKA   Existing Precautions/Restrictions fall   Right Lower Extremity (Weight-bearing Status) weight-bearing as tolerated (WBAT)   Pain Assessment   Patient Currently in Pain Yes, see Vital Sign flowsheet  (RN contacted for pain management)   Range of Motion Comprehensive   General Range of Motion no range of motion deficits identified   Strength Comprehensive (MMT)   General Manual Muscle Testing (MMT) Assessment no strength deficits identified   Transfers   Transfers sit-stand transfer   Sit-Stand Transfer   Sit-Stand [place] (Transfers) minimum assist (75% patient effort)   Assistive Device (Sit-Stand Transfers) walker, front-wheeled   Activities of Daily Living   BADL Assessment/Intervention toileting;lower body dressing   Lower Body Dressing Assessment/Training   [place] Level (Lower Body Dressing) dependent (less than 25% patient effort)   Toileting   Comment, (Toileting) per clinical judgement   [place] Level (Toileting) contact guard assist    Clinical Impression   Criteria for Skilled Therapeutic Interventions Met (OT) Yes, treatment indicated   OT Diagnosis decreased ADL independence   OT Problem List-Impairments impacting ADL problems related to;pain;mobility;post-surgical precautions   Assessment of Occupational Performance 1-3 Performance Deficits   Identified Performance Deficits dressing, functional transfers, toileting   Planned Therapy Interventions (OT) ADL retraining;transfer training   Clinical Decision Making Complexity (OT) problem focused assessment/low complexity   Risk & Benefits of therapy have been explained patient;son   OT Total Evaluation Time   OT Eval, Low Complexity Minutes (95492) 8   Therapy Certification   Medical Diagnosis s/p R TKA   Start of Care Date 02/28/24   Certification date from 02/28/24   Certification date to 02/28/24   OT Goals   Therapy Frequency (OT) One time eval and treatment   OT Predicted Duration/Target Date for Goal Attainment 02/28/24   OT Goals Lower Body Dressing;Toilet Transfer/Toileting;Transfers   OT: Lower Body Dressing Maximum assist   OT: Transfer with assistive device  (demo understanding of safe technique)   OT: Toilet Transfer/Toileting toilet transfer  (demo understanding of safe technique)   Interventions   Interventions Quick Adds Self-Care/Home Management   Self-Care/Home Management   Self-Care/Home Mgmt/ADL, Compensatory, Meal Prep Minutes (73124) 10   Symptoms Noted During/After Treatment (Meal Preparation/Planning Training) fatigue;increased pain   Treatment Detail/Skilled Intervention Pt and son ed on TKA precautions and ADL implications. Both verbalized understanding. Son reports pt will have 24/7 assist from pt's 2 daughters at d/c. Ed on AE recommendations for home (RTS w/ arms or toilet safety frame). Resources given for purchasing/obtaining. Son verbalized understanding. Ed on safe transfer technique from toilet w/ bilateral grab bars. Pt demo'ed understanding. Pt limited by pain.  Contacted RN for request for pain meds. All OT questions/concerns addressed during session.   OT Discharge Planning   OT Discharge Recommendation (DC Rec)   (defer to ortho)   OT Rationale for DC Rec Pt below baseline for ADL performance. Limited by pain, fatigue. Family able to provide 24/7 assist at d/c.   Total Session Time   Timed Code Treatment Minutes 10   Total Session Time (sum of timed and untimed services) 18        [facility]  OUTPATIENT OCCUPATIONAL THERAPY  EVALUATION  PLAN OF TREATMENT FOR OUTPATIENT REHABILITATION  (COMPLETE FOR INITIAL CLAIMS ONLY)  Patient's Last Name, First Name, M.I.  YOB: 1950  Yane Courtney                          Provider's Name  [facility] Medical Record No.  [id number]                             Onset Date:  02/27/24   Start of Care Date:  02/28/24   Type:     ___PT   _X_OT   ___SLP Medical Diagnosis:  s/p R TKA                    OT Diagnosis:  decreased ADL independence Visits from SOC:  1     See note for plan of treatment, functional goals and certification details    I CERTIFY THE NEED FOR THESE SERVICES FURNISHED UNDER        THIS PLAN OF TREATMENT AND WHILE UNDER MY CARE     (Physician co-signature of this document indicates review and certification of the therapy plan).

## 2024-02-28 NOTE — PROGRESS NOTES
Orthopedic Surgery  Yane Courtney  02/28/2024     Admit Date:  2/27/2024    POD: 1 Day Post-Op   Procedure(s):  RIGHT  KNEE TOTAL ARTHROPLASTY    Patient resting comfortably in bed, son at bedside to interpret  Pain controlled.  Tolerating oral intake.    Ambulated to bathroom this AM  Voiding adequately  Denies nausea or vomiting.  Denies chest pain or shortness of breath.  No acute events overnight.    Temp:  [98  F (36.7  C)-98.9  F (37.2  C)] 98.1  F (36.7  C)  Pulse:  [82-99] 97  Resp:  [15-25] 20  BP: (127-187)/(52-82) 159/69  SpO2:  [95 %-100 %] 96 %    Alert and oriented.   Right dressing is clean, dry, and intact.   Bilateral calves are soft, non-tender.  Right lower extremity is NVI.  Patient able to resist ankle dorsiflexion and plantar flexion bilaterally.  DP pulse palpable.  Sensation intact bilateral lower extremities.      A/P    DVT prophylaxis: Resume PTA Eliquis  Activity:  WBAT   Pain: Dilaudid/tylenol.  Dressings: daily island dressing. Change prior to discharge.    Anticipate discharge to home witih  family later this morning pending PT clearance    Follow-up: 2 weeks post-op with Marry Cisneros PA-C/Mu Cisneros PA-C  [facility]

## 2024-02-28 NOTE — PROGRESS NOTES
Diagnosis: R TKA  POD#: 1  Mental Status: A&Ox4, [place] speaking  Activity/dangle up 1 GB walker  Diet: regular  Pain: PO dilaudid, atarax  Lozada/Voiding: BR/BSC  Tele: NSR  02/LDA: Pepper JADE  D/C Date: pending  Other Info: Dressing CDI. Cms intact

## 2024-02-29 NOTE — PROGRESS NOTES
Vss, cms intact. Up with 1 and walker. Pain managed with dilaudid, tylenol and atarax. Dressing changed, incision looks good. Reviewed AVS with patient, son and daughter. Discharge medications and instruction sheets given. A&OX4. Left floor via wheelchair.

## (undated) DEVICE — GLOVE BIOGEL PI SZ 7.0 40870

## (undated) DEVICE — ESU GROUND PAD UNIVERSAL W/O CORD

## (undated) DEVICE — GLOVE BIOGEL PI SZ 7.5 40875

## (undated) DEVICE — DRAPE MAYO STAND 23X54 8337

## (undated) DEVICE — SU VICRYL 2-0 CP-1 27" UND J266H

## (undated) DEVICE — BONE CEMENT MIXEVAC III HI VAC KIT  0206-015-000

## (undated) DEVICE — SUCTION TIP YANKAUER W/O VENT K86

## (undated) DEVICE — SU ETHIBOND 0 CTX CR  8X18" CX31D

## (undated) DEVICE — SOL NACL 0.9% IRRIG 1000ML BOTTLE 2F7124

## (undated) DEVICE — MANIFOLD NEPTUNE 4 PORT 700-20

## (undated) DEVICE — PACK TOTAL KNEE SOP15TKFSD

## (undated) DEVICE — SOL WATER IRRIG 1000ML BOTTLE 2F7114

## (undated) DEVICE — SOLUTION WOUND CLEANSING 3/4OZ 10% PVP EA-L3011FB-50

## (undated) DEVICE — HOOD SURG T7PLUS PEEL AWAY FACE SHIELD STRL LF 0416-801-100

## (undated) DEVICE — GLOVE BIOGEL PI MICRO INDICATOR UNDERGLOVE SZ 8.0 48980

## (undated) DEVICE — LINEN TOWEL PACK X5 5464

## (undated) DEVICE — BLADE SAW SAGITTAL THK1.13 MM L90 MM X W18 MM 4118-127-090

## (undated) DEVICE — SYR 50ML LL W/O NDL 309653

## (undated) DEVICE — STPL SKIN 35W 6.9MM  PXW35

## (undated) DEVICE — GLOVE BIOGEL PI MICRO INDICATOR UNDERGLOVE SZ 6.5 48965

## (undated) DEVICE — DRAPE STERI U 1015

## (undated) DEVICE — WRAP EZY KNEE 1213PP

## (undated) DEVICE — BLADE SAW SAGITTAL STRK 25X90X1.37MM 4H SYS 6 6125-137-090

## (undated) DEVICE — NDL SPINAL 18GA 3.5" 405184

## (undated) DEVICE — SOL NACL 0.9% IRRIG 3000ML BAG 2B7477

## (undated) DEVICE — SUCTION IRR SYSTEM W/O TIP INTERPULSE HANDPIECE 0210-100-000

## (undated) DEVICE — DRSG XEROFORM 5X9" 8884431605

## (undated) DEVICE — DRAPE SHEET REV FOLD 3/4 9349

## (undated) DEVICE — SU VICRYL 0 CP-1 27" J467H

## (undated) DEVICE — BONE CLEANING TIP INTERPULSE  0210-010-000

## (undated) RX ORDER — PROPOFOL 10 MG/ML
INJECTION, EMULSION INTRAVENOUS
Status: DISPENSED
Start: 2024-02-27

## (undated) RX ORDER — FENTANYL CITRATE 50 UG/ML
INJECTION, SOLUTION INTRAMUSCULAR; INTRAVENOUS
Status: DISPENSED
Start: 2024-02-27

## (undated) RX ORDER — CITRIC ACID/SODIUM CITRATE 334-500MG
SOLUTION, ORAL ORAL
Status: DISPENSED
Start: 2024-02-27

## (undated) RX ORDER — CEFAZOLIN SODIUM/WATER 2 G/20 ML
SYRINGE (ML) INTRAVENOUS
Status: DISPENSED
Start: 2024-02-27

## (undated) RX ORDER — TRANEXAMIC ACID 650 MG/1
TABLET ORAL
Status: DISPENSED
Start: 2024-02-27

## (undated) RX ORDER — VANCOMYCIN HYDROCHLORIDE 1 G/20ML
INJECTION, POWDER, LYOPHILIZED, FOR SOLUTION INTRAVENOUS
Status: DISPENSED
Start: 2024-02-27

## (undated) RX ORDER — HYDROMORPHONE HYDROCHLORIDE 1 MG/ML
INJECTION, SOLUTION INTRAMUSCULAR; INTRAVENOUS; SUBCUTANEOUS
Status: DISPENSED
Start: 2024-02-27